# Patient Record
Sex: FEMALE | Race: WHITE | NOT HISPANIC OR LATINO | Employment: OTHER | URBAN - METROPOLITAN AREA
[De-identification: names, ages, dates, MRNs, and addresses within clinical notes are randomized per-mention and may not be internally consistent; named-entity substitution may affect disease eponyms.]

---

## 2017-09-06 ENCOUNTER — HISTORICAL (OUTPATIENT)
Dept: ADMINISTRATIVE | Facility: HOSPITAL | Age: 72
End: 2017-09-06

## 2017-10-18 LAB — BMD RECOMMENDATION EXT: NORMAL

## 2018-02-27 ENCOUNTER — HISTORICAL (OUTPATIENT)
Dept: ADMINISTRATIVE | Facility: HOSPITAL | Age: 73
End: 2018-02-27

## 2021-07-21 ENCOUNTER — HISTORICAL (OUTPATIENT)
Dept: ADMINISTRATIVE | Facility: HOSPITAL | Age: 76
End: 2021-07-21

## 2021-08-04 ENCOUNTER — HISTORICAL (OUTPATIENT)
Dept: ADMINISTRATIVE | Facility: HOSPITAL | Age: 76
End: 2021-08-04

## 2022-04-10 ENCOUNTER — HISTORICAL (OUTPATIENT)
Dept: ADMINISTRATIVE | Facility: HOSPITAL | Age: 77
End: 2022-04-10
Payer: MEDICARE

## 2022-04-29 VITALS
WEIGHT: 158.75 LBS | HEIGHT: 71 IN | OXYGEN SATURATION: 97 % | DIASTOLIC BLOOD PRESSURE: 74 MMHG | WEIGHT: 163.13 LBS | BODY MASS INDEX: 31.05 KG/M2 | HEIGHT: 61 IN | BODY MASS INDEX: 22.84 KG/M2 | SYSTOLIC BLOOD PRESSURE: 131 MMHG | OXYGEN SATURATION: 98 % | DIASTOLIC BLOOD PRESSURE: 77 MMHG | SYSTOLIC BLOOD PRESSURE: 148 MMHG | WEIGHT: 164.44 LBS | HEIGHT: 61 IN | BODY MASS INDEX: 29.97 KG/M2

## 2022-05-03 NOTE — HISTORICAL [facility]
This is a historical note converted from [employer]. Formatting and pictures may have been removed.  Please reference [employer] for original formatting and attached multimedia. Chief Complaint  right foot pain  History of Present Illness  75-year-old female with a right foot injury sustained 2 weeks ago. ?She was in her home when she?stepped on something I was on the floor and the foot got bent underneath her and she fell forward.? She had good bit of pain and difficulty bearing weight so she presents the emergency department was diagnosed with metatarsal fractures. ?She was given a boot.? She struggled for a couple of weeks with pain but?now recently the last few days she is starting to feel a good bit better.? She is still getting around on a crutch.? Some swelling, no open wounds.? Non-smoker, nondiabetic.  Review of Systems  Constitutional:?no fever, fatigue, weakness  Eye:?no vision loss, eye redness, drainage, or pain  ENMT:?no sore throat, ear pain, sinus pain/congestion, nasal congestion/drainage  Respiratory:?no cough, no wheezing, no shortness of breath  Cardiovascular:?no chest pain, no palpitations, no edema  Gastrointestinal:?no nausea, vomiting, or diarrhea. No abdominal pain  Physical Exam  Vitals & Measurements  HT:?156.00?cm? WT:?74.600?kg? BMI:?30.65?  No acute distress, alert and oriented  Regular rate on peripheral exam  No increased work of breathing  ?  Right lower extremity  The midfoot is?moderately swollen and tender to palpation  She is nontender palpation of the base of the fifth, the toes,?the calcaneus of the medial and lateral malleoli.? EHL/FHL intact and sensation is intact to light touch to all nerve distributions  DP 2+  ?  X-ray imaging of the right foot reviewed significant for?minimally displaced base of the second and third metatarsal fractures  Assessment/Plan  Pain in right foot?M79.671  ?Base of the second and?third metatarsal?fractures, nondisplaced  These fractures will do well  nonoperatively and she is clinically improving.? She will wear a boot for comfort for the next few weeks as she is unable to wean out of this as her symptoms resolved.? She will take over-the-counter medications for pain. ?She will follow-up with us on an as-needed basis. ?I attempted to answer all of her questions and?discussed with her diagnosis and expected course  ?  ATTENDING ADDENDUM  ?  Lynette Hernandez?was evaluated at the time of the encounter with?Dr Whiting.? HPI, PE and treatment plan was reviewed.? Treatment plan was reasonable and necessary.??Imaging was reviewed at the time of visit.??  ?  metatarsal fx  ?   Medications  DIGOXIN 125 MCG TABLET, 125 mcg= 1 tab(s), Oral, Daily  METOPROLOL SUCC ER 50 MG TAB, 50 mg= 1 tab(s), Oral, Daily  XARELTO 20 MG TABLET, 20 mg= 1 tab(s), Oral, qPM

## 2022-05-03 NOTE — HISTORICAL [facility]
This is a historical note converted from [employer]. Formatting and pictures may have been removed.  Please reference [place] for original formatting and attached multimedia. Chief Complaint  Right foot pain, swelling. Non traumatic  History of Present Illness  as stated in [facility]CCLURE.  Review of Systems  rt foot pain with redness/edema, bruising x today,? no injury noted. no footboard in the bed. feels like a bad sprain. has had a non traumatic wrist fracture 3 years ago. denies hx of spinal/hip/femur fractures. takes xarelto and dig for a fib, did not take them today.  Physical Exam  Vitals & Measurements  T:?37.2? ?C (Oral)? HR:?87(Peripheral)? RR:?18? BP:?131/77? SpO2:?98%?  HT:?156.00?cm? WT:?72.000?kg? BMI:?29.59?  General:?well-developed well-nourished in no acute distress  Eye: PERRLA, EOMI, clear conjunctiva, eyelids normal  Neck: full range of motion, no thyromegaly or lymphadenopathy  Cardiovascular:? right DP 2+, irregular, right PT 2+, irregular.  Gastrointestinal:?soft, non-tender, non-distended with normal bowel sounds  Musculoskeletal:?PANDEY, ambulatory  Integumentary: erythematous over dorsal midfoot. purple ecchymosis in the arch of the foot and medial plantar surface. no evidence of insect bites. the bony prominences of the 3rd, 4th, 5th toes and the great toe are equally erythematous and calloused.  Neurologic: cranial nerves?grossly?intact, no signs of peripheral neurological deficit  Assessment/Plan  1.?Metatarsal stress fracture of right foot?M84.374A,?  ?Ortho referral entered.  Placed in walking boot during visit.  Pt has crutches fitted to her at home.  Declines Rx pain med, states tylenol is adequate for her.  Light activity as tolerated, elevate often, ice foot up to tid up to 10m/time.  Please notify PCP of your fractures as this is the 2nd occurrence of non traumatic bone fractures in 3 years.  Metatarsal stress fracture of right foot?M84.374A  Ordered:  MD to Nurse, 07/21/21 18:50:00 CDT, Please  apply walking boot to R foot/LE., 07/21/21 18:50:00 CDT  Office/Outpatient Visit Level 4 New [id number] PC, Metatarsal stress fracture of right foot  Swelling of right foot, 07/21/21 18:51:00 CDT  ?  2.?Swelling of right foot?M79.89  ?as above.  Ordered:  Office/Outpatient Visit Level 4 New [id number] PC, Metatarsal stress fracture of right foot  Swelling of right foot, 07/21/21 18:51:00 CDT  ?  Referrals  [facility] Internal Referral to Orthopedics Clinic, Specialty: Orthopedic Surgery, Reason: Right foot non traumatic 2nd, 3rd metatarsal fractures. Pt has remote hx of non traumatic wrist fracture as well., Type: xray, walking boot, light activity, elevate often, ice up to tid., Start: 07/21/21 18:51:00...   Problem List/Past Medical History  Ongoing  Atrial fibrillation  Historical  No qualifying data  Procedure/Surgical History  Partial hysterectomy  Tonsil   Medications  DIGOXIN 125 MCG TABLET, 125 mcg= 1 tab(s), Oral, Daily  METOPROLOL SUCC ER 50 MG TAB, 50 mg= 1 tab(s), Oral, Daily  XARELTO 20 MG TABLET, 20 mg= 1 tab(s), Oral, qPM  Allergies  No Known Allergies  Social History  Abuse/Neglect  No, No, Yes, 07/21/2021  Alcohol  Current, 09/06/2017  Employment/School  Retired, 09/06/2017  Exercise  Exercise duration: 60. Exercise type: Walking, Weight lifting., 09/06/2017  Home/Environment  Lives with Alone., 09/06/2017  Nutrition/Health  Type of diet: Regular Diet. Regular, 09/06/2017  Sexual  Sexually active: No., 09/06/2017  Substance Use  Never, 09/06/2017  Tobacco  Never (less than 100 in lifetime), N/A, 07/21/2021  Family History  Atrial fibrillation: Brother.  Congestive heart disease.: Father.  Primary malignant neoplasm of lung: Mother.  Primary malignant neoplasm of prostate: Brother.  Health Maintenance  Health Maintenance  ???Pending?(in the next year)  ??? ??OverDue  ??? ? ? ?Bone Density Screening due??10/18/19??and every 2??year(s)  ??? ? ? ?Influenza Vaccine due??10/01/20??and every 1??day(s)  ??? ? ? ?Advance  Directive due??01/02/21??and every 1??year(s)  ??? ? ? ?Cognitive Screening due??01/02/21??and every 1??year(s)  ??? ? ? ?Functional Assessment due??01/02/21??and every 1??year(s)  ??? ??Due?  ??? ? ? ?Aspirin Therapy for CVD Prevention due??07/21/21??and every 1??year(s)  ??? ? ? ?Colorectal Screening due??07/21/21??Unknown Frequency  ??? ? ? ?Diabetes Screening due??07/21/21??Unknown Frequency  ??? ? ? ?Hypertension Management-BMP due??07/21/21??Unknown Frequency  ??? ? ? ?Lipid Screening due??07/21/21??Unknown Frequency  ??? ? ? ?Medicare Annual Wellness Exam due??07/21/21??and every 1??year(s)  ??? ? ? ?Pneumococcal Vaccine due??07/21/21??Unknown Frequency  ??? ? ? ?Tetanus Vaccine due??07/21/21??and every 10??year(s)  ??? ? ? ?Zoster Vaccine due??07/21/21??Unknown Frequency  ??? ??Due In Future?  ??? ? ? ?Obesity Screening not due until??01/01/22??and every 1??year(s)  ??? ? ? ?Alcohol Misuse Screening not due until??01/02/22??and every 1??year(s)  ??? ? ? ?Fall Risk Assessment not due until??01/02/22??and every 1??year(s)  ???Satisfied?(in the past 1 year)  ??? ??Satisfied?  ??? ? ? ?ADL Screening on??07/21/21.??Satisfied by Roselyn Lantigua LPN.  ??? ? ? ?Alcohol Misuse Screening on??07/21/21.??Satisfied by Roselyn Lantigua LPN.  ??? ? ? ?Blood Pressure Screening on??07/21/21.??Satisfied by Roselyn Lantigua LPN.  ??? ? ? ?Body Mass Index Check on??07/21/21.??Satisfied by Roselyn Lantigua LPN P.  ??? ? ? ?Depression Screening on??07/21/21.??Satisfied by Roselyn Lantigua LPN P.  ??? ? ? ?Fall Risk Assessment on??07/21/21.??Satisfied by Roselyn Lantigua LPN P.  ??? ? ? ?Hypertension Management-Blood Pressure on??07/21/21.??Satisfied by Roselyn Lantigua LPN P.  ??? ? ? ?Obesity Screening on??07/21/21.??Satisfied by Roselyn Lantigua LPN P.  ?  Diagnostic Results  (07/21/2021 18:31 CDT XR Foot Right Minimum 3 Views)  Radiology Report  XR Foot Right Minimum 3 Views  ?  HISTORY: Pitting edema, bruising R foot.  ?  COMPARISON:  None.  ?  FINDINGS:  There are nondisplaced fractures at the bases of the second and third  metatarsals. The Lisfranc interval remains well aligned. There are  scattered degenerative changes of the MTP and interphalangeal joints  with osteophyte formation. There is a mild hallux valgus deformity.  The soft tissues are unremarkable.  ?  ?  IMPRESSION:  Nondisplaced fractures at the bases of the second and third  metatarsals.  ?  ?  Signature Line  Electronically Signed By: Luli Ryan MD  Date/Time Signed: 07/21/2021 18:36 [1]     [1]?XR Foot Right Minimum 3 Views; Luli Ryan MD 07/21/2021 18:31 CDT

## 2022-07-14 ENCOUNTER — DOCUMENTATION ONLY (OUTPATIENT)
Dept: ADMINISTRATIVE | Facility: HOSPITAL | Age: 77
End: 2022-07-14
Payer: MEDICARE

## 2023-01-12 ENCOUNTER — OFFICE VISIT (OUTPATIENT)
Dept: INTERNAL MEDICINE | Facility: CLINIC | Age: 78
End: 2023-01-12
Payer: MEDICARE

## 2023-01-12 VITALS
SYSTOLIC BLOOD PRESSURE: 125 MMHG | DIASTOLIC BLOOD PRESSURE: 82 MMHG | HEIGHT: 61 IN | BODY MASS INDEX: 30.24 KG/M2 | TEMPERATURE: 98 F | WEIGHT: 160.19 LBS | HEART RATE: 103 BPM | OXYGEN SATURATION: 96 % | RESPIRATION RATE: 20 BRPM

## 2023-01-12 DIAGNOSIS — I48.91 ATRIAL FIBRILLATION, UNSPECIFIED TYPE: ICD-10-CM

## 2023-01-12 DIAGNOSIS — E55.9 VITAMIN D DEFICIENCY: ICD-10-CM

## 2023-01-12 DIAGNOSIS — Z11.59 NEED FOR HEPATITIS C SCREENING TEST: Primary | ICD-10-CM

## 2023-01-12 DIAGNOSIS — E78.5 HYPERLIPIDEMIA, UNSPECIFIED HYPERLIPIDEMIA TYPE: ICD-10-CM

## 2023-01-12 DIAGNOSIS — Z00.00 WELLNESS EXAMINATION: ICD-10-CM

## 2023-01-12 DIAGNOSIS — M81.0 OSTEOPOROSIS, UNSPECIFIED OSTEOPOROSIS TYPE, UNSPECIFIED PATHOLOGICAL FRACTURE PRESENCE: ICD-10-CM

## 2023-01-12 PROCEDURE — 99214 OFFICE O/P EST MOD 30 MIN: CPT | Mod: PBBFAC

## 2023-01-12 RX ORDER — RIVAROXABAN 20 MG/1
1 TABLET, FILM COATED ORAL DAILY
COMMUNITY
Start: 2022-09-08 | End: 2023-05-22 | Stop reason: SDUPTHER

## 2023-01-12 RX ORDER — METOPROLOL SUCCINATE 25 MG/1
1 TABLET, EXTENDED RELEASE ORAL DAILY
Status: ON HOLD | COMMUNITY
Start: 2022-08-29 | End: 2023-03-16 | Stop reason: SDUPTHER

## 2023-01-12 RX ORDER — DIGOXIN 125 MCG
1 TABLET ORAL DAILY
Status: ON HOLD | COMMUNITY
Start: 2022-08-29 | End: 2023-03-16 | Stop reason: HOSPADM

## 2023-01-18 ENCOUNTER — HOSPITAL ENCOUNTER (OUTPATIENT)
Dept: RADIOLOGY | Facility: HOSPITAL | Age: 78
Discharge: HOME | End: 2023-01-18
Attending: INTERNAL MEDICINE
Payer: MEDICARE

## 2023-01-18 DIAGNOSIS — M81.0 OSTEOPOROSIS, UNSPECIFIED OSTEOPOROSIS TYPE, UNSPECIFIED PATHOLOGICAL FRACTURE PRESENCE: ICD-10-CM

## 2023-01-18 PROCEDURE — 77080 DXA BONE DENSITY AXIAL: CPT | Mod: TC

## 2023-03-14 ENCOUNTER — HOSPITAL ENCOUNTER (INPATIENT)
Facility: HOSPITAL | Age: 78
LOS: 1 days | Discharge: HOME | DRG: 308 | End: 2023-03-16
Attending: EMERGENCY MEDICINE | Admitting: INTERNAL MEDICINE
Payer: MEDICARE

## 2023-03-14 ENCOUNTER — OFFICE VISIT (OUTPATIENT)
Dept: URGENT CARE | Facility: CLINIC | Age: 78
DRG: 308 | End: 2023-03-14
Payer: MEDICARE

## 2023-03-14 VITALS
DIASTOLIC BLOOD PRESSURE: 86 MMHG | RESPIRATION RATE: 20 BRPM | SYSTOLIC BLOOD PRESSURE: 132 MMHG | OXYGEN SATURATION: 96 % | WEIGHT: 160 LBS | HEIGHT: 61 IN | HEART RATE: 98 BPM | TEMPERATURE: 98 F | BODY MASS INDEX: 30.21 KG/M2

## 2023-03-14 DIAGNOSIS — R06.02 SOB (SHORTNESS OF BREATH) ON EXERTION: ICD-10-CM

## 2023-03-14 DIAGNOSIS — I50.20 HFREF (HEART FAILURE WITH REDUCED EJECTION FRACTION): ICD-10-CM

## 2023-03-14 DIAGNOSIS — I48.91 ATRIAL FIBRILLATION WITH RAPID VENTRICULAR RESPONSE: Primary | ICD-10-CM

## 2023-03-14 DIAGNOSIS — J81.0 ACUTE PULMONARY EDEMA: ICD-10-CM

## 2023-03-14 DIAGNOSIS — R68.89 FLU-LIKE SYMPTOMS: ICD-10-CM

## 2023-03-14 DIAGNOSIS — R09.89 SYMPTOMS OF UPPER RESPIRATORY INFECTION (URI): ICD-10-CM

## 2023-03-14 DIAGNOSIS — J30.9 ALLERGIC RHINITIS, UNSPECIFIED SEASONALITY, UNSPECIFIED TRIGGER: ICD-10-CM

## 2023-03-14 DIAGNOSIS — R07.9 CHEST PAIN: ICD-10-CM

## 2023-03-14 DIAGNOSIS — R07.9 CHEST PAIN, UNSPECIFIED TYPE: ICD-10-CM

## 2023-03-14 PROBLEM — R05.9 COUGH: Status: ACTIVE | Noted: 2023-03-14

## 2023-03-14 PROBLEM — R06.09 DOE (DYSPNEA ON EXERTION): Status: ACTIVE | Noted: 2023-03-14

## 2023-03-14 LAB
ALBUMIN SERPL-MCNC: 4.3 G/DL (ref 3.4–4.8)
ALBUMIN/GLOB SERPL: 1.3 RATIO (ref 1.1–2)
ALP SERPL-CCNC: 96 UNIT/L (ref 40–150)
ALT SERPL-CCNC: 29 UNIT/L (ref 0–55)
AST SERPL-CCNC: 29 UNIT/L (ref 5–34)
BASOPHILS # BLD AUTO: 0.04 X10(3)/MCL (ref 0–0.2)
BASOPHILS NFR BLD AUTO: 0.5 %
BILIRUBIN DIRECT+TOT PNL SERPL-MCNC: 1.6 MG/DL
BNP BLD-MCNC: 1283.1 PG/ML
BUN SERPL-MCNC: 11 MG/DL (ref 9.8–20.1)
CALCIUM SERPL-MCNC: 10.8 MG/DL (ref 8.4–10.2)
CHLORIDE SERPL-SCNC: 105 MMOL/L (ref 98–107)
CK MB SERPL-MCNC: 1.9 NG/ML
CK SERPL-CCNC: 109 U/L (ref 29–168)
CO2 SERPL-SCNC: 26 MMOL/L (ref 23–31)
CREAT SERPL-MCNC: 0.8 MG/DL (ref 0.55–1.02)
CTP QC/QA: YES
D DIMER PPP IA.FEU-MCNC: 0.84 UG/ML FEU (ref 0–0.5)
DIGOXIN SERPL-MCNC: 0.4 NG/ML (ref 0.8–2)
EOSINOPHIL # BLD AUTO: 0.03 X10(3)/MCL (ref 0–0.9)
EOSINOPHIL NFR BLD AUTO: 0.4 %
ERYTHROCYTE [DISTWIDTH] IN BLOOD BY AUTOMATED COUNT: 13.6 % (ref 11.5–17)
FLUAV AG UPPER RESP QL IA.RAPID: NOT DETECTED
FLUAV AG UPPER RESP QL IA.RAPID: NOT DETECTED
FLUBV AG UPPER RESP QL IA.RAPID: NOT DETECTED
FLUBV AG UPPER RESP QL IA.RAPID: NOT DETECTED
GFR SERPLBLD CREATININE-BSD FMLA CKD-EPI: >60 MLS/MIN/1.73/M2
GLOBULIN SER-MCNC: 3.4 GM/DL (ref 2.4–3.5)
GLUCOSE SERPL-MCNC: 98 MG/DL (ref 82–115)
HCT VFR BLD AUTO: 48 % (ref 37–47)
HGB BLD-MCNC: 15.8 G/DL (ref 12–16)
IMM GRANULOCYTES # BLD AUTO: 0.01 X10(3)/MCL (ref 0–0.04)
IMM GRANULOCYTES NFR BLD AUTO: 0.1 %
LYMPHOCYTES # BLD AUTO: 2.24 X10(3)/MCL (ref 0.6–4.6)
LYMPHOCYTES NFR BLD AUTO: 30.1 %
MAGNESIUM SERPL-MCNC: 2.3 MG/DL (ref 1.6–2.6)
MCH RBC QN AUTO: 34.2 PG
MCHC RBC AUTO-ENTMCNC: 32.9 G/DL (ref 33–36)
MCV RBC AUTO: 103.9 FL (ref 80–94)
MONOCYTES # BLD AUTO: 0.53 X10(3)/MCL (ref 0.1–1.3)
MONOCYTES NFR BLD AUTO: 7.1 %
NEUTROPHILS # BLD AUTO: 4.59 X10(3)/MCL (ref 2.1–9.2)
NEUTROPHILS NFR BLD AUTO: 61.8 %
NRBC BLD AUTO-RTO: 0 %
PHOSPHATE SERPL-MCNC: 2.9 MG/DL (ref 2.3–4.7)
PLATELET # BLD AUTO: 237 X10(3)/MCL (ref 130–400)
PMV BLD AUTO: 11.3 FL (ref 7.4–10.4)
POTASSIUM SERPL-SCNC: 3.8 MMOL/L (ref 3.5–5.1)
PROT SERPL-MCNC: 7.7 GM/DL (ref 5.8–7.6)
RBC # BLD AUTO: 4.62 X10(6)/MCL (ref 4.2–5.4)
SARS-COV-2 RDRP RESP QL NAA+PROBE: NEGATIVE
SARS-COV-2 RNA RESP QL NAA+PROBE: NOT DETECTED
SODIUM SERPL-SCNC: 141 MMOL/L (ref 136–145)
T4 FREE SERPL-MCNC: 1.07 NG/DL (ref 0.7–1.48)
TROPONIN I SERPL-MCNC: 0.02 NG/ML (ref 0–0.04)
TSH SERPL-ACNC: 1.03 UIU/ML (ref 0.35–4.94)
WBC # SPEC AUTO: 7.4 X10(3)/MCL (ref 4.5–11.5)

## 2023-03-14 PROCEDURE — 84443 ASSAY THYROID STIM HORMONE: CPT | Performed by: STUDENT IN AN ORGANIZED HEALTH CARE EDUCATION/TRAINING PROGRAM

## 2023-03-14 PROCEDURE — 80162 ASSAY OF DIGOXIN TOTAL: CPT | Performed by: EMERGENCY MEDICINE

## 2023-03-14 PROCEDURE — 25000003 PHARM REV CODE 250: Performed by: EMERGENCY MEDICINE

## 2023-03-14 PROCEDURE — 0240U COVID/FLU A&B PCR: CPT | Performed by: EMERGENCY MEDICINE

## 2023-03-14 PROCEDURE — 99214 OFFICE O/P EST MOD 30 MIN: CPT | Mod: PBBFAC,25

## 2023-03-14 PROCEDURE — 25000003 PHARM REV CODE 250: Performed by: STUDENT IN AN ORGANIZED HEALTH CARE EDUCATION/TRAINING PROGRAM

## 2023-03-14 PROCEDURE — 87502 INFLUENZA DNA AMP PROBE: CPT

## 2023-03-14 PROCEDURE — 96376 TX/PRO/DX INJ SAME DRUG ADON: CPT

## 2023-03-14 PROCEDURE — 84439 ASSAY OF FREE THYROXINE: CPT | Performed by: STUDENT IN AN ORGANIZED HEALTH CARE EDUCATION/TRAINING PROGRAM

## 2023-03-14 PROCEDURE — 84484 ASSAY OF TROPONIN QUANT: CPT | Performed by: EMERGENCY MEDICINE

## 2023-03-14 PROCEDURE — 83735 ASSAY OF MAGNESIUM: CPT | Performed by: STUDENT IN AN ORGANIZED HEALTH CARE EDUCATION/TRAINING PROGRAM

## 2023-03-14 PROCEDURE — 99204 PR OFFICE/OUTPT VISIT, NEW, LEVL IV, 45-59 MIN: ICD-10-PCS | Mod: S$PBB,,,

## 2023-03-14 PROCEDURE — 85025 COMPLETE CBC W/AUTO DIFF WBC: CPT | Performed by: EMERGENCY MEDICINE

## 2023-03-14 PROCEDURE — 99204 OFFICE O/P NEW MOD 45 MIN: CPT | Mod: S$PBB,,,

## 2023-03-14 PROCEDURE — 93005 ELECTROCARDIOGRAM TRACING: CPT

## 2023-03-14 PROCEDURE — G0378 HOSPITAL OBSERVATION PER HR: HCPCS

## 2023-03-14 PROCEDURE — 84100 ASSAY OF PHOSPHORUS: CPT | Performed by: STUDENT IN AN ORGANIZED HEALTH CARE EDUCATION/TRAINING PROGRAM

## 2023-03-14 PROCEDURE — 83880 ASSAY OF NATRIURETIC PEPTIDE: CPT | Performed by: EMERGENCY MEDICINE

## 2023-03-14 PROCEDURE — 82553 CREATINE MB FRACTION: CPT | Performed by: EMERGENCY MEDICINE

## 2023-03-14 PROCEDURE — 63600175 PHARM REV CODE 636 W HCPCS: Performed by: EMERGENCY MEDICINE

## 2023-03-14 PROCEDURE — 82550 ASSAY OF CK (CPK): CPT | Performed by: EMERGENCY MEDICINE

## 2023-03-14 PROCEDURE — 80053 COMPREHEN METABOLIC PANEL: CPT | Performed by: EMERGENCY MEDICINE

## 2023-03-14 PROCEDURE — 87635 SARS-COV-2 COVID-19 AMP PRB: CPT | Mod: PBBFAC

## 2023-03-14 PROCEDURE — 99285 EMERGENCY DEPT VISIT HI MDM: CPT | Mod: 25,27

## 2023-03-14 PROCEDURE — 85379 FIBRIN DEGRADATION QUANT: CPT | Performed by: EMERGENCY MEDICINE

## 2023-03-14 PROCEDURE — 96375 TX/PRO/DX INJ NEW DRUG ADDON: CPT

## 2023-03-14 RX ORDER — ENALAPRILAT 1.25 MG/ML
1.25 INJECTION INTRAVENOUS EVERY 6 HOURS PRN
Status: DISCONTINUED | OUTPATIENT
Start: 2023-03-14 | End: 2023-03-15

## 2023-03-14 RX ORDER — METOPROLOL SUCCINATE 25 MG/1
25 TABLET, EXTENDED RELEASE ORAL 2 TIMES DAILY
Status: DISCONTINUED | OUTPATIENT
Start: 2023-03-15 | End: 2023-03-14

## 2023-03-14 RX ORDER — FUROSEMIDE 10 MG/ML
20 INJECTION INTRAMUSCULAR; INTRAVENOUS
Status: COMPLETED | OUTPATIENT
Start: 2023-03-14 | End: 2023-03-14

## 2023-03-14 RX ORDER — METOPROLOL SUCCINATE 25 MG/1
25 TABLET, EXTENDED RELEASE ORAL 2 TIMES DAILY
Status: DISCONTINUED | OUTPATIENT
Start: 2023-03-14 | End: 2023-03-15

## 2023-03-14 RX ORDER — SODIUM CHLORIDE 0.9 % (FLUSH) 0.9 %
10 SYRINGE (ML) INJECTION
Status: DISCONTINUED | OUTPATIENT
Start: 2023-03-14 | End: 2023-03-16 | Stop reason: HOSPADM

## 2023-03-14 RX ORDER — TALC
6 POWDER (GRAM) TOPICAL NIGHTLY PRN
Status: DISCONTINUED | OUTPATIENT
Start: 2023-03-14 | End: 2023-03-16 | Stop reason: HOSPADM

## 2023-03-14 RX ORDER — METOPROLOL TARTRATE 1 MG/ML
10 INJECTION, SOLUTION INTRAVENOUS
Status: COMPLETED | OUTPATIENT
Start: 2023-03-14 | End: 2023-03-14

## 2023-03-14 RX ORDER — LABETALOL HCL 20 MG/4 ML
10 SYRINGE (ML) INTRAVENOUS EVERY 4 HOURS PRN
Status: DISCONTINUED | OUTPATIENT
Start: 2023-03-14 | End: 2023-03-14

## 2023-03-14 RX ORDER — DIGOXIN 125 MCG
0.12 TABLET ORAL DAILY
Status: DISCONTINUED | OUTPATIENT
Start: 2023-03-15 | End: 2023-03-15

## 2023-03-14 RX ORDER — METOPROLOL TARTRATE 1 MG/ML
5 INJECTION, SOLUTION INTRAVENOUS EVERY 5 MIN PRN
Status: DISCONTINUED | OUTPATIENT
Start: 2023-03-14 | End: 2023-03-15

## 2023-03-14 RX ADMIN — METOPROLOL TARTRATE 10 MG: 1 INJECTION, SOLUTION INTRAVENOUS at 02:03

## 2023-03-14 RX ADMIN — METOPROLOL SUCCINATE 25 MG: 25 TABLET, EXTENDED RELEASE ORAL at 08:03

## 2023-03-14 RX ADMIN — METOPROLOL TARTRATE 10 MG: 1 INJECTION, SOLUTION INTRAVENOUS at 04:03

## 2023-03-14 RX ADMIN — FUROSEMIDE 20 MG: 10 INJECTION, SOLUTION INTRAVENOUS at 04:03

## 2023-03-14 NOTE — PROGRESS NOTES
"Subjective:       Patient ID: Lynette Hernandez is a 77 y.o. female.    Vitals:  height is 5' 1" (1.549 m) and weight is 72.6 kg (160 lb). Her oral temperature is 98.4 °F (36.9 °C). Her blood pressure is 132/86 and her pulse is 98. Her respiration is 20 and oxygen saturation is 96%.     Chief Complaint: Cough, Shortness of Breath, Chest Pain (tightness), and Nasal Congestion (Patient states she was feeling bad x 1 week, got better, then started feeling bad again yesterday)    Cc as above. States symptoms started a week ago. It comes and goes. Last episode of chest discomfort and SOB was on Sunday.     Cough  This is a new problem. The current episode started yesterday. The problem has been unchanged. Episode frequency: intermittent. The cough is Non-productive. Associated symptoms include chest pain and shortness of breath. The symptoms are aggravated by lying down. atrial fibrillation   Shortness of Breath  This is a new problem. The current episode started in the past 7 days. The problem occurs intermittently. Associated symptoms include chest pain. Exacerbated by: exertion.   Chest Pain   This is a new problem. The current episode started in the past 7 days. The problem occurs intermittently. Associated symptoms include a cough and shortness of breath. Past medical history comments: atrial fibrillation     Cardiovascular:  Positive for chest pain.   Respiratory:  Positive for cough and shortness of breath.      Objective:      Physical Exam   Constitutional: She is oriented to person, place, and time. She appears well-developed. She is cooperative.  Non-toxic appearance. She does not appear ill. No distress.   HENT:   Head: Normocephalic and atraumatic.   Ears:   Right Ear: Hearing, external ear and ear canal normal. A middle ear effusion is present.   Left Ear: Hearing, external ear and ear canal normal. A middle ear effusion is present.   Nose: Congestion present. No mucosal edema, rhinorrhea or nasal deformity. " No epistaxis. Right sinus exhibits no maxillary sinus tenderness and no frontal sinus tenderness. Left sinus exhibits no maxillary sinus tenderness and no frontal sinus tenderness.   Mouth/Throat: Uvula is midline, oropharynx is clear and moist and mucous membranes are normal. Mucous membranes are moist. No trismus in the jaw. Normal dentition. No uvula swelling. No posterior oropharyngeal erythema. Oropharynx is clear.   Mild nasal turbates      Comments: Mild nasal turbates  Eyes: Conjunctivae and lids are normal. Right eye exhibits no discharge. Left eye exhibits no discharge. No scleral icterus.   Neck: Trachea normal and phonation normal. Neck supple.   Cardiovascular: Normal rate, normal heart sounds and normal pulses. An irregular rhythm present.   Pulmonary/Chest: Effort normal and breath sounds normal. No respiratory distress.   Abdominal: Normal appearance.   Musculoskeletal: Normal range of motion.         General: Normal range of motion.   Neurological: She is alert and oriented to person, place, and time. She exhibits normal muscle tone.   Skin: Skin is warm, dry, intact and not diaphoretic.   Psychiatric: Her speech is normal and behavior is normal. Mood, judgment and thought content normal.   Nursing note and vitals reviewed.      Assessment:       1. Atrial fibrillation with rapid ventricular response    2. Chest pain, unspecified type    3. SOB (shortness of breath) on exertion    4. Symptoms of upper respiratory infection (URI)    5. Flu-like symptoms    6. Allergic rhinitis, unspecified seasonality, unspecified trigger             Results for orders placed or performed in visit on 03/14/23   POCT COVID-19 Rapid Screening   Result Value Ref Range    POC Rapid COVID Negative Negative     Acceptable Yes       Plan:         Atrial fibrillation with rapid ventricular response  -     Refer to Emergency Dept.    Chest pain, unspecified type    SOB (shortness of breath) on  exertion    Symptoms of upper respiratory infection (URI)  -     POCT COVID-19 Rapid Screening    Flu-like symptoms  -     FLU A & B PCR; Future; Expected date: 03/14/2023    Allergic rhinitis, unspecified seasonality, unspecified trigger    EKG- Atrial fibrillation w/RVR. Report called to MICHAEL Arias in ER.  She needs higher level of care (cardiac workup). Sent to ER per wheelchair.

## 2023-03-14 NOTE — H&P
[facility] Internal Medicine History and Physical     Date of Admit: 3/14/2023    Chief Complaint     Palpitations and Shortness of Breath (PT BROUGHT FROM  FOR CARD. TRACY.  PT REPORTS SOB, CHEST TIGHTNESS W PAL. AND FATIGUE X 1 WK.  EKG OBTAINED AFIB W RVR NOTED. )     Subjective:      History of Present Illness:  Lynette Hernandez is a 77 y.o. female who has a PMH of hypertension, RBBB (present on EKG from 2004), and atrial fibrillation for 6 years (follows with Dr. Crystal). The patient presented to [facility] ED on 3/14/2023 with a primary complaint of SOB and chest heaviness for 1 week. Patient initially presented to Urgent Care but was sent to ED because of HR in 140s. Patient repots onset of chest heaviness and shortness of breath about 10 days ago on Saturday evening when she was awakened from sleep due to these symptoms. Symptoms did not resolve with deep breaths and took several minutes to resolve. Patient states she then noticed her breathing was better and would worsen during these episodes intermittently for the next week. She denies similar episodes in the past. States she has also noticed she becomes fatigued more easily with exertion and has a dry cough which all developed about 1 week ago. Denies fever, chills, n/v, dysuria, diarrhea, constipation. Denies any recent changes to medication. Upon arrival to ED, vitals were as follows: T 97.9F, /109, , RR 18, SpO2 96% on RA. ED workup significant for negative troponin 0.021, mildly elevated d dimer, non-ischemic EKG, elevated BNP 1283, subtherapeutic digoxin level 0.4, and XR Chest concerning for pulmonary vascular congestion. Admitted to Internal Medicine for management of AF RVR and further workup for possible new onset CHF.    Past Medical History:  Past Medical History:   Diagnosis Date    Anticoagulant long-term use     Atrial fibrillation     Hypertension        Past Surgical History:  History reviewed. No pertinent surgical  "history.    Allergies:  Review of patient's allergies indicates:  No Known Allergies    Home Medications:  Prior to Admission medications    Medication Sig Start Date End Date Taking? Authorizing Provider   digoxin (LANOXIN) 125 mcg tablet Take 1 tablet by mouth Daily. 8/29/22   Historical Provider   metoprolol succinate (TOPROL-XL) 25 MG 24 hr tablet Take 1 tablet by mouth once daily. 8/29/22   Historical Provider   XARELTO 20 mg Tab Take 1 tablet by mouth once daily. 9/8/22   Historical Provider       Family History:  Family History   Problem Relation Age of Onset    Lung cancer Mother     Heart disease Father     Heart disease Brother        Social History:  Social History     Tobacco Use    Smoking status: Never     Passive exposure: Past    Smokeless tobacco: Never   Substance Use Topics    Alcohol use: Yes     Alcohol/week: 1.0 standard drink     Types: 1 Glasses of wine per week    Drug use: Never       Review of Systems:  Review of Systems   Constitutional:  Positive for malaise/fatigue.   HENT: Negative.     Eyes: Negative.    Respiratory:  Positive for cough and shortness of breath. Negative for sputum production.    Cardiovascular:  Positive for orthopnea. Negative for leg swelling.   Gastrointestinal: Negative.    Genitourinary: Negative.    Musculoskeletal: Negative.    Skin: Negative.    Neurological:  Positive for headaches.        Objective:   Last 24 Hour Vital Signs:  Vitals  BP: (!) 149/107  Temp: 98.6 °F (37 °C)  Temp Source: Tympanic  Pulse: (!) 126  Resp: 18  SpO2: 95 %  Height: 5' 2" (157.5 cm)  Weight: 72 kg (158 lb 11.7 oz)    Physical Examination:  General: Patient resting comfortably, in no acute distress   HEENT: Head-normocephalic and atraumatic, EOMI, dry mucous membranes  Respiratory: clear to auscultation bilaterally and upper lung fields, decreased breath sounds with mild fine crackles at bilateral bases  Cardiovascular: irregular rate and rhythm without murmurs.  No gallops or rubs, " no JVD.    Gastrointestinal: soft, non-tender, non-distended with normal bowel sounds, without masses to palpation  Musculoskeletal: full range of motion of all extremities/spine without limitation or discomfort, mild left ankle edema, no LE edema  Integumentary: no rashes or skin lesions present  Neurologic: no signs of peripheral neurological deficit, motor/sensory function intact  Psychiatric:  alert and oriented, cognitive function intact, cooperative with exam        Laboratory:  Most Recent Data:  Most Recent Data:  CBC:   Lab Results   Component Value Date    WBC 7.4 03/14/2023    HGB 15.8 03/14/2023    HCT 48.0 (H) 03/14/2023     03/14/2023    .9 (H) 03/14/2023    RDW 13.6 03/14/2023     WBC Differential:   Recent Labs   Lab 03/14/23  1448   WBC 7.4   HGB 15.8   HCT 48.0*      .9*     BMP:   Lab Results   Component Value Date     03/14/2023    K 3.8 03/14/2023    CO2 26 03/14/2023    BUN 11.0 03/14/2023    CREATININE 0.80 03/14/2023    CALCIUM 10.8 (H) 03/14/2023     LFTs:   Lab Results   Component Value Date    ALBUMIN 4.3 03/14/2023    BILITOT 1.6 (H) 03/14/2023    AST 29 03/14/2023    ALKPHOS 96 03/14/2023    ALT 29 03/14/2023     Coags: No results found for: INR, PROTIME, PTT  FLP: No results found for: CHOL, HDL, LDLCALC, TRIG, CHOLHDL  DM:   Lab Results   Component Value Date    CREATININE 0.80 03/14/2023     Thyroid: No results found for: TSH, FREET4, Z0QCMLR, O9KEWBA, THYROIDAB  Anemia: No results found for: IRON, TIBC, FERRITIN, [id number], FOLATE  Cardiac:   Lab Results   Component Value Date    TROPONINI 0.021 03/14/2023     Urinalysis: No results found for: LABURIN, COLORU, PHUA, CLARITYU, SPECGRAV, LABSPEC, NITRITE, PROTEINUR, GLUCOSEU, KETONESU, UROBILINOGEN, BILIRUBINUR, BLOODU, RBCU, WBCUA    Radiology:  Imaging Results              X-Ray Chest AP Portable (Final result)  Result time 03/14/23 15:13:54      Final result by Jay Oneal MD (03/14/23  15:13:54)                   Impression:      Interstitial predominant opacities with small pleural effusions, suspect pulmonary vascular congestion.      Electronically signed by: Jay Oneal  Date:    03/14/2023  Time:    15:13               Narrative:    EXAMINATION:  XR CHEST AP PORTABLE    CLINICAL HISTORY:  Chest Pain;    COMPARISON:  27 February 2018    FINDINGS:  Portable frontal view of the chest was obtained. Cardiac silhouette is enlarged.  There are bilateral interstitial predominant opacities.  Suspect small pleural effusions.  No pneumothorax.                                           Assessment & Plan:     AF RVR  -Hx of A Fib x 6 years  -Home regimen: digoxin 0.125mg daily, Toprol 25mg daily, Xarelto 20mg daily  -HR up to 130s in ED, treated with IV metoprolol x 2  -Come home digoxin, xarelto  -Increase metoprolol to 25mg BID  -Tele monitoring    Possible new onset CHF  -Dry cough, SOB, CARUSO x 1 week  -XR Chest with b/l interstitial predominant opacities, small pleural effusions, suspected pulmonary vascular congestion  -COVID/FLU negative  -Given lasix 20mg IV x 1 in ED  -Trop negative at 0.021, BNP 1283.1  -ECHO tomorrow   -Strict I's and O's    Hypertension  -BP up to 149/107 in ED  -Toprol for AF increased to 35mg BID  -PRN antihypertensives    Hypercalcemia  -Initial Ca 10.8  -Diuresing at this time, encourage PO intake  -Follow up AM labs      CODE STATUS: Full Code  Access: Peripheral  Antibiotics: None  Diet: Cardiac  DVT Prophylaxis: Xarelto, SCDs  GI Prophylaxis: None  Fluids: None    Dispo: Day 1 admission for AF RVR and possible new onset CHF      Marielena Rose MD  [facility], -  3/14/2023

## 2023-03-14 NOTE — ED PROVIDER NOTES
Encounter Date: 3/14/2023       History     Chief Complaint   Patient presents with    Palpitations    Shortness of Breath     PT BROUGHT FROM  FOR CARD. TRACY.  PT REPORTS SOB, CHEST TIGHTNESS W PAL. AND FATIGUE X 1 WK.  EKG OBTAINED AFIB W RVR NOTED.      Symptoms for about a week, presented at urgent care and referred here for further evaluation.  She describes intermittent and somewhat irregular symptoms of dyspnea particularly on exertion, a sense of irregular breathing, and a sense of chest tightness at times.  No pain, fever, cough, or lower extremity swelling.  Her leg sometimes hurt with walking as well.  She is not aware for heart rate but is known to have had atrial fibrillation for about 6 years on chronic Xarelto, digoxin, and 25 mg once a day metoprolol.  She feels mildly generally fatigued.  No syncope or presyncope.  Noted earlier today to have heart rates ranging from .  No other complaints.    The history is provided by the patient. No  was used.   Review of patient's allergies indicates:  No Known Allergies  Past Medical History:   Diagnosis Date    Anticoagulant long-term use     Atrial fibrillation     Hypertension      History reviewed. No pertinent surgical history.  Family History   Problem Relation Age of Onset    Lung cancer Mother     Heart disease Father     Heart disease Brother      Social History     Tobacco Use    Smoking status: Never     Passive exposure: Past    Smokeless tobacco: Never   Substance Use Topics    Alcohol use: Yes     Alcohol/week: 1.0 standard drink     Types: 1 Glasses of wine per week    Drug use: Never     Review of Systems   Constitutional:  Negative for activity change, fatigue and fever.   HENT:  Negative for congestion, ear pain, facial swelling, nosebleeds, sinus pressure and sore throat.    Eyes:  Negative for pain, discharge, redness and visual disturbance.   Respiratory:  Positive for chest tightness and shortness of  breath. Negative for cough, choking and wheezing.    Cardiovascular:  Negative for chest pain, palpitations and leg swelling.   Gastrointestinal:  Negative for abdominal distention, abdominal pain, nausea and vomiting.   Endocrine: Negative for heat intolerance, polydipsia and polyuria.   Genitourinary:  Negative for difficulty urinating, dysuria, flank pain, hematuria and urgency.   Musculoskeletal:  Negative for back pain, gait problem, joint swelling and myalgias.   Skin:  Negative for color change and rash.   Allergic/Immunologic: Negative for environmental allergies and food allergies.   Neurological:  Negative for dizziness, weakness, numbness and headaches.   Hematological:  Negative for adenopathy. Does not bruise/bleed easily.   Psychiatric/Behavioral:  Negative for agitation and behavioral problems. The patient is not nervous/anxious.    All other systems reviewed and are negative.    Physical Exam     Initial Vitals [03/14/23 1348]   BP Pulse Resp Temp SpO2   (!) 145/109 (!) 135 18 97.9 °F (36.6 °C) 96 %      MAP       --         Physical Exam    Nursing note and vitals reviewed.  Constitutional: She appears well-developed and well-nourished. She is not diaphoretic. No distress.   HENT:   Head: Normocephalic and atraumatic.   Mouth/Throat: No oropharyngeal exudate.   Eyes: Conjunctivae and EOM are normal. Pupils are equal, round, and reactive to light. Right eye exhibits no discharge. Left eye exhibits no discharge. No scleral icterus.   Neck: Neck supple. No thyromegaly present. No tracheal deviation present. No JVD present.   Normal range of motion.  Cardiovascular:  Normal heart sounds and intact distal pulses.     Exam reveals no gallop and no friction rub.       No murmur heard.  Tachycardic irregular rate and rhythm, atrial fibrillation with ventricular response between 115 and 145 by monitor   Pulmonary/Chest: Breath sounds normal. No stridor. No respiratory distress. She has no wheezes. She has no  rhonchi. She has no rales. She exhibits no tenderness.   Abdominal: Abdomen is soft. Bowel sounds are normal. She exhibits no distension and no mass. There is no abdominal tenderness. There is no rebound and no guarding.   Musculoskeletal:         General: No tenderness or edema. Normal range of motion.      Cervical back: Normal range of motion and neck supple.     Neurological: She is alert and oriented to person, place, and time. She has normal strength.   Skin: Skin is warm and dry. No rash and no abscess noted. No erythema.   Psychiatric: She has a normal mood and affect. Her behavior is normal. Judgment and thought content normal.       ED Course   Critical Care    Date/Time: 3/14/2023 8:00 PM  Performed by: Jay Whittaker MD  Authorized by: Betty Reed MD   Direct patient critical care time: 10 minutes  Additional history critical care time: 10 minutes  Ordering / reviewing critical care time: 5 minutes  Documentation critical care time: 10 minutes  Consulting other physicians critical care time: 5 minutes  Total critical care time (exclusive of procedural time) : 40 minutes  Critical care time was exclusive of separately billable procedures and treating other patients and teaching time.  Critical care was necessary to treat or prevent imminent or life-threatening deterioration of the following conditions: cardiac failure and respiratory failure.  Critical care was time spent personally by me on the following activities: development of treatment plan with patient or surrogate, examination of patient, obtaining history from patient or surrogate, ordering and performing treatments and interventions, ordering and review of laboratory studies, ordering and review of radiographic studies and pulse oximetry.      Labs Reviewed   COMPREHENSIVE METABOLIC PANEL - Abnormal; Notable for the following components:       Result Value    Calcium Level Total 10.8 (*)     Protein Total 7.7 (*)     Bilirubin Total 1.6 (*)      All other components within normal limits   DIGOXIN LEVEL - Abnormal; Notable for the following components:    Digoxin Level 0.4 (*)     All other components within normal limits   D DIMER, QUANTITATIVE - Abnormal; Notable for the following components:    D-Dimer 0.84 (*)     All other components within normal limits   CBC WITH DIFFERENTIAL - Abnormal; Notable for the following components:    Hct 48.0 (*)     .9 (*)     MCHC 32.9 (*)     MPV 11.3 (*)     All other components within normal limits   TROPONIN I - Normal   CK - Normal   CK-MB - Normal   CBC W/ AUTO DIFFERENTIAL    Narrative:     The following orders were created for panel order CBC auto differential.  Procedure                               Abnormality         Status                     ---------                               -----------         ------                     CBC with Differential[[id number]]        Abnormal            Final result                 Please view results for these tests on the individual orders.   COVID/FLU A&B PCR   EXTRA TUBES    Narrative:     The following orders were created for panel order EXTRA TUBES.  Procedure                               Abnormality         Status                     ---------                               -----------         ------                     Pink Top Hold[[id number]]                                    In process                   Please view results for these tests on the individual orders.   PINK TOP HOLD   B-TYPE NATRIURETIC PEPTIDE     EKG Readings: (Independently Interpreted)   Initial Reading: No STEMI. Rhythm: Atrial Fibrillation. Heart Rate: 111. Conduction: RBBB.   Atrial fibrillation with rapid ventricular response, right bundle-branch block, no acute ischemic change.   ECG Results              EKG 12-lead (Final result)  Result time 03/14/23 15:19:06      Final result by Interface, Lab In [facility] (03/14/23 15:19:06)                   Narrative:    Test Reason :  R07.9,    Vent. Rate : 111 BPM     Atrial Rate : 104 BPM     P-R Int : 000 ms          QRS Dur : 132 ms      QT Int : 364 ms       P-R-T Axes : 000 079 057 degrees     QTc Int : 495 ms    Atrial fibrillation with rapid ventricular response  Right bundle branch block  Abnormal ECG  No previous ECGs available  Confirmed by Kuldip Paul MD ([id number]) on 3/14/2023 3:18:55 PM    Referred By: [email or web address]   SELF           Confirmed By:Kuldip Paul MD                                  Imaging Results              X-Ray Chest AP Portable (Final result)  Result time 03/14/23 15:13:54      Final result by Jay Oneal MD (03/14/23 15:13:54)                   Impression:      Interstitial predominant opacities with small pleural effusions, suspect pulmonary vascular congestion.      Electronically signed by: Jay Oneal  Date:    03/14/2023  Time:    15:13               Narrative:    EXAMINATION:  XR CHEST AP PORTABLE    CLINICAL HISTORY:  Chest Pain;    COMPARISON:  27 February 2018    FINDINGS:  Portable frontal view of the chest was obtained. Cardiac silhouette is enlarged.  There are bilateral interstitial predominant opacities.  Suspect small pleural effusions.  No pneumothorax.                                       Medications   metoprolol injection 10 mg (has no administration in time range)   furosemide injection 20 mg (has no administration in time range)   metoprolol injection 10 mg (10 mg Intravenous Given 3/14/23 1450)        Additional MDM:   Differential Diagnosis:   A fib RVR/ MI/ CHF/ Pleural effusions                   3:57 PM Heart rate now in the upper 90s to low 100s, feels better, otherwise no clinical change.  Reviewed findings in detail with patient and family, consulted Internal Medicine.  Additional beta blockade, gentle diuresis, admit for further adjustment and echocardiogram.  She has not had an echocardiogram in many years.    Medical Decision Making  Problems Addressed:  Acute pulmonary edema:  acute illness or injury  Atrial fibrillation with rapid ventricular response: chronic illness or injury with exacerbation, progression, or side effects of treatment    Amount and/or Complexity of Data Reviewed  Labs: ordered. Decision-making details documented in ED Course.  Radiology: ordered. Decision-making details documented in ED Course.  ECG/medicine tests: ordered and independent interpretation performed. Decision-making details documented in ED Course.    Risk  Prescription drug management.  Decision regarding hospitalization.               Clinical Impression:   Final diagnoses:  [R07.9] Chest pain  [I48.91] Atrial fibrillation with rapid ventricular response (Primary)  [J81.0] Acute pulmonary edema        ED Disposition Condition    Observation Stable                Jay Whittaker MD  03/14/23 1600       Jay Whittaker MD  04/02/23 1148

## 2023-03-15 LAB
ALBUMIN SERPL-MCNC: 3.5 G/DL (ref 3.4–4.8)
ALBUMIN/GLOB SERPL: 1.4 RATIO (ref 1.1–2)
ALP SERPL-CCNC: 78 UNIT/L (ref 40–150)
ALT SERPL-CCNC: 25 UNIT/L (ref 0–55)
AORTIC ROOT ANNULUS: 3 CM
AST SERPL-CCNC: 24 UNIT/L (ref 5–34)
AV INDEX (PROSTH): 0.45
AV MEAN GRADIENT: 7 MMHG
AV PEAK GRADIENT: 10 MMHG
AV REGURGITATION PRESSURE HALF TIME: 651.17 MS
AV VALVE AREA: 1.41 CM2
AV VELOCITY RATIO: 0.42
BASOPHILS # BLD AUTO: 0.04 X10(3)/MCL (ref 0–0.2)
BASOPHILS NFR BLD AUTO: 0.6 %
BILIRUBIN DIRECT+TOT PNL SERPL-MCNC: 1.4 MG/DL
BSA FOR ECHO PROCEDURE: 1.77 M2
BUN SERPL-MCNC: 13.6 MG/DL (ref 9.8–20.1)
CALCIUM SERPL-MCNC: 10 MG/DL (ref 8.4–10.2)
CHLORIDE SERPL-SCNC: 107 MMOL/L (ref 98–107)
CO2 SERPL-SCNC: 25 MMOL/L (ref 23–31)
CREAT SERPL-MCNC: 0.75 MG/DL (ref 0.55–1.02)
CV ECHO LV RWT: 0.5 CM
DOP CALC AO PEAK VEL: 1.58 M/S
DOP CALC AO VTI: 21.8 CM
DOP CALC LVOT AREA: 3.1 CM2
DOP CALC LVOT DIAMETER: 1.99 CM
DOP CALC LVOT PEAK VEL: 0.66 M/S
DOP CALC LVOT STROKE VOLUME: 30.78 CM3
DOP CALC MV VTI: 27.3 CM
DOP CALC RVOT AREA: 17.34 CM2
DOP CALC RVOT DIAMETER: 4.7 CM
DOP CALCLVOT PEAK VEL VTI: 9.9 CM
E WAVE DECELERATION TIME: 161.88 MSEC
E/A RATIO: 101
E/E' RATIO: 40.4 M/S
ECHO LV POSTERIOR WALL: 1.07 CM (ref 0.6–1.1)
EJECTION FRACTION: 20 %
EOSINOPHIL # BLD AUTO: 0.05 X10(3)/MCL (ref 0–0.9)
EOSINOPHIL NFR BLD AUTO: 0.8 %
ERYTHROCYTE [DISTWIDTH] IN BLOOD BY AUTOMATED COUNT: 13.5 % (ref 11.5–17)
FOLATE SERPL-MCNC: 12.5 NG/ML (ref 7–31.4)
FRACTIONAL SHORTENING: 15 % (ref 28–44)
GFR SERPLBLD CREATININE-BSD FMLA CKD-EPI: >60 MLS/MIN/1.73/M2
GLOBULIN SER-MCNC: 2.5 GM/DL (ref 2.4–3.5)
GLUCOSE SERPL-MCNC: 103 MG/DL (ref 82–115)
HCT VFR BLD AUTO: 40 % (ref 37–47)
HGB BLD-MCNC: 13.1 G/DL (ref 12–16)
HR MV ECHO: 125 BPM
IMM GRANULOCYTES # BLD AUTO: 0.02 X10(3)/MCL (ref 0–0.04)
IMM GRANULOCYTES NFR BLD AUTO: 0.3 %
INTERVENTRICULAR SEPTUM: 0.99 CM (ref 0.6–1.1)
LEFT ATRIUM SIZE: 4.38 CM
LEFT ATRIUM VOLUME INDEX MOD: 52 ML/M2
LEFT ATRIUM VOLUME MOD: 90 CM3
LEFT INTERNAL DIMENSION IN SYSTOLE: 3.62 CM (ref 2.1–4)
LEFT VENTRICLE DIASTOLIC VOLUME INDEX: 54.91 ML/M2
LEFT VENTRICLE DIASTOLIC VOLUME: 95 ML
LEFT VENTRICLE MASS INDEX: 85 G/M2
LEFT VENTRICLE SYSTOLIC VOLUME INDEX: 43.4 ML/M2
LEFT VENTRICLE SYSTOLIC VOLUME: 75 ML
LEFT VENTRICULAR INTERNAL DIMENSION IN DIASTOLE: 4.27 CM (ref 3.5–6)
LEFT VENTRICULAR MASS: 146.85 G
LV LATERAL E/E' RATIO: 33.67 M/S
LV SEPTAL E/E' RATIO: 50.5 M/S
LVOT MG: 0.89 MMHG
LVOT MV: 0.43 CM/S
LYMPHOCYTES # BLD AUTO: 1.6 X10(3)/MCL (ref 0.6–4.6)
LYMPHOCYTES NFR BLD AUTO: 24.3 %
MAGNESIUM SERPL-MCNC: 1.9 MG/DL (ref 1.6–2.6)
MCH RBC QN AUTO: 34.1 PG
MCHC RBC AUTO-ENTMCNC: 32.8 G/DL (ref 33–36)
MCV RBC AUTO: 104.2 FL (ref 80–94)
MONOCYTES # BLD AUTO: 0.59 X10(3)/MCL (ref 0.1–1.3)
MONOCYTES NFR BLD AUTO: 9 %
MV MEAN GRADIENT: 2 MMHG
MV PEAK A VEL: 0.01 M/S
MV PEAK E VEL: 1.01 M/S
MV PEAK GRADIENT: 5 MMHG
MV STENOSIS PRESSURE HALF TIME: 46.94 MS
MV VALVE AREA BY CONTINUITY EQUATION: 1.13 CM2
MV VALVE AREA P 1/2 METHOD: 4.69 CM2
NEUTROPHILS # BLD AUTO: 4.29 X10(3)/MCL (ref 2.1–9.2)
NEUTROPHILS NFR BLD AUTO: 65 %
NRBC BLD AUTO-RTO: 0 %
PHOSPHATE SERPL-MCNC: 3.3 MG/DL (ref 2.3–4.7)
PISA AR MAX VEL: 4.9 M/S
PISA MRMAX VEL: 5.93 M/S
PISA TR MAX VEL: 2.99 M/S
PLATELET # BLD AUTO: 204 X10(3)/MCL (ref 130–400)
PMV BLD AUTO: 11 FL (ref 7.4–10.4)
POTASSIUM SERPL-SCNC: 3.8 MMOL/L (ref 3.5–5.1)
PROT SERPL-MCNC: 6 GM/DL (ref 5.8–7.6)
PTH-INTACT SERPL-MCNC: 111.8 PG/ML (ref 8.7–77)
RA PRESSURE: 8 MMHG
RA WIDTH: 5.8 CM
RBC # BLD AUTO: 3.84 X10(6)/MCL (ref 4.2–5.4)
SODIUM SERPL-SCNC: 141 MMOL/L (ref 136–145)
TDI LATERAL: 0.03 M/S
TDI SEPTAL: 0.02 M/S
TDI: 0.03 M/S
TR MAX PG: 36 MMHG
TRICUSPID ANNULAR PLANE SYSTOLIC EXCURSION: 1.14 CM
TV REST PULMONARY ARTERY PRESSURE: 44 MMHG
VIT B12 SERPL-MCNC: 690 PG/ML (ref 213–816)
WBC # SPEC AUTO: 6.6 X10(3)/MCL (ref 4.5–11.5)

## 2023-03-15 PROCEDURE — 84100 ASSAY OF PHOSPHORUS: CPT | Performed by: STUDENT IN AN ORGANIZED HEALTH CARE EDUCATION/TRAINING PROGRAM

## 2023-03-15 PROCEDURE — 25000003 PHARM REV CODE 250: Performed by: INTERNAL MEDICINE

## 2023-03-15 PROCEDURE — 63600175 PHARM REV CODE 636 W HCPCS: Performed by: STUDENT IN AN ORGANIZED HEALTH CARE EDUCATION/TRAINING PROGRAM

## 2023-03-15 PROCEDURE — 96376 TX/PRO/DX INJ SAME DRUG ADON: CPT

## 2023-03-15 PROCEDURE — 25000003 PHARM REV CODE 250: Performed by: STUDENT IN AN ORGANIZED HEALTH CARE EDUCATION/TRAINING PROGRAM

## 2023-03-15 PROCEDURE — 82746 ASSAY OF FOLIC ACID SERUM: CPT | Performed by: STUDENT IN AN ORGANIZED HEALTH CARE EDUCATION/TRAINING PROGRAM

## 2023-03-15 PROCEDURE — 21400001 HC TELEMETRY ROOM

## 2023-03-15 PROCEDURE — 86039 ANTINUCLEAR ANTIBODIES (ANA): CPT | Mod: 90 | Performed by: STUDENT IN AN ORGANIZED HEALTH CARE EDUCATION/TRAINING PROGRAM

## 2023-03-15 PROCEDURE — 86036 ANCA SCREEN EACH ANTIBODY: CPT | Mod: 90 | Performed by: STUDENT IN AN ORGANIZED HEALTH CARE EDUCATION/TRAINING PROGRAM

## 2023-03-15 PROCEDURE — 94761 N-INVAS EAR/PLS OXIMETRY MLT: CPT

## 2023-03-15 PROCEDURE — 82607 VITAMIN B-12: CPT | Performed by: STUDENT IN AN ORGANIZED HEALTH CARE EDUCATION/TRAINING PROGRAM

## 2023-03-15 PROCEDURE — 86235 NUCLEAR ANTIGEN ANTIBODY: CPT | Performed by: STUDENT IN AN ORGANIZED HEALTH CARE EDUCATION/TRAINING PROGRAM

## 2023-03-15 PROCEDURE — 83516 IMMUNOASSAY NONANTIBODY: CPT | Mod: 90 | Performed by: STUDENT IN AN ORGANIZED HEALTH CARE EDUCATION/TRAINING PROGRAM

## 2023-03-15 PROCEDURE — 85025 COMPLETE CBC W/AUTO DIFF WBC: CPT | Performed by: STUDENT IN AN ORGANIZED HEALTH CARE EDUCATION/TRAINING PROGRAM

## 2023-03-15 PROCEDURE — 83970 ASSAY OF PARATHORMONE: CPT | Performed by: STUDENT IN AN ORGANIZED HEALTH CARE EDUCATION/TRAINING PROGRAM

## 2023-03-15 PROCEDURE — 80053 COMPREHEN METABOLIC PANEL: CPT | Performed by: STUDENT IN AN ORGANIZED HEALTH CARE EDUCATION/TRAINING PROGRAM

## 2023-03-15 PROCEDURE — 96365 THER/PROPH/DIAG IV INF INIT: CPT

## 2023-03-15 PROCEDURE — 83735 ASSAY OF MAGNESIUM: CPT | Performed by: STUDENT IN AN ORGANIZED HEALTH CARE EDUCATION/TRAINING PROGRAM

## 2023-03-15 RX ORDER — MAGNESIUM SULFATE 1 G/100ML
1 INJECTION INTRAVENOUS ONCE
Status: COMPLETED | OUTPATIENT
Start: 2023-03-15 | End: 2023-03-15

## 2023-03-15 RX ORDER — DIGOXIN 250 MCG
0.25 TABLET ORAL DAILY
Status: DISCONTINUED | OUTPATIENT
Start: 2023-03-15 | End: 2023-03-15

## 2023-03-15 RX ORDER — POTASSIUM CHLORIDE 20 MEQ/1
40 TABLET, EXTENDED RELEASE ORAL ONCE
Status: COMPLETED | OUTPATIENT
Start: 2023-03-15 | End: 2023-03-15

## 2023-03-15 RX ORDER — METOPROLOL TARTRATE 25 MG/1
25 TABLET, FILM COATED ORAL 3 TIMES DAILY
Status: DISCONTINUED | OUTPATIENT
Start: 2023-03-15 | End: 2023-03-16 | Stop reason: HOSPADM

## 2023-03-15 RX ORDER — FUROSEMIDE 10 MG/ML
40 INJECTION INTRAMUSCULAR; INTRAVENOUS ONCE
Status: COMPLETED | OUTPATIENT
Start: 2023-03-15 | End: 2023-03-15

## 2023-03-15 RX ORDER — DIGOXIN 250 MCG
0.25 TABLET ORAL DAILY
Status: DISCONTINUED | OUTPATIENT
Start: 2023-03-16 | End: 2023-03-16 | Stop reason: HOSPADM

## 2023-03-15 RX ORDER — METOPROLOL TARTRATE 1 MG/ML
5 INJECTION, SOLUTION INTRAVENOUS EVERY 5 MIN PRN
Status: DISCONTINUED | OUTPATIENT
Start: 2023-03-15 | End: 2023-03-16 | Stop reason: HOSPADM

## 2023-03-15 RX ADMIN — METOPROLOL TARTRATE 5 MG: 1 INJECTION, SOLUTION INTRAVENOUS at 01:03

## 2023-03-15 RX ADMIN — METOPROLOL TARTRATE 25 MG: 25 TABLET, FILM COATED ORAL at 03:03

## 2023-03-15 RX ADMIN — FUROSEMIDE 40 MG: 10 INJECTION, SOLUTION INTRAMUSCULAR; INTRAVENOUS at 03:03

## 2023-03-15 RX ADMIN — RIVAROXABAN 20 MG: 10 TABLET, FILM COATED ORAL at 05:03

## 2023-03-15 RX ADMIN — MAGNESIUM SULFATE IN DEXTROSE 1 G: 10 INJECTION, SOLUTION INTRAVENOUS at 08:03

## 2023-03-15 RX ADMIN — METOPROLOL SUCCINATE 25 MG: 25 TABLET, EXTENDED RELEASE ORAL at 08:03

## 2023-03-15 RX ADMIN — POTASSIUM CHLORIDE 40 MEQ: 1500 TABLET, EXTENDED RELEASE ORAL at 08:03

## 2023-03-15 RX ADMIN — METOPROLOL TARTRATE 25 MG: 25 TABLET, FILM COATED ORAL at 10:03

## 2023-03-15 RX ADMIN — METOPROLOL TARTRATE 5 MG: 1 INJECTION, SOLUTION INTRAVENOUS at 11:03

## 2023-03-15 NOTE — MEDICAL/APP STUDENT
[facility] Medicine Wards   Progress Note     Resident Team: [facility] Medicine List {#:[id number]}  Attending Physician: Betty Reed MD  Resident: ***  Intern: ***     Subjective:      Brief HPI:  77 year-old female presented to the Emergency Department on 03/13/2023 with 10-day history of intermittent SOB and chest tightness. 10 days ago, she was awakened from sleep due to these symptoms, which were not resolved despite sitting up and taking deep breaths. The patient noticed that the symptoms were worsened, along with a dry cough and exertional fatigue over the next week. She denied similar symptoms in the past. She has a PMH of HTN, RBBB (presenton EKG from 2004), and atrial fibrillation for 6 years (currently follows with Dr. Crystal).    In the ED, vitals were:T 97.9F, /109, , RR 18, SpO2 96% on RA. ED workup indicated nonischemic EKG, negative troponin 0.021, mildly elevated d dimer, elevated calcium 10.8, elevated BNP 1283, subtherapeutic digoxin level 0.4, and XR Chest concerning for pulmonary vascular congestion and cardiomegaly.    Today: The patient denies fatigue, dry cough, headache, abdominal pain, diarrhea, constipation, dysuria,and hematuria. Lab indicates unremarkable calcium 10.0, TSH and T4. Urine output is 300 mL overnight. Awaiting cardiology    Interval History: ***      Review of Systems:  Pertinent ROS negative unless otherwise stated above.       Objective:     Vital Signs (Most Recent):  Temp: 98.9 °F (37.2 °C) (03/15/23 0407)  Pulse: (!) 116 (03/15/23 0407)  Resp: 20 (03/14/23 1725)  BP: 128/77 (03/15/23 0407)  SpO2: 95 % (03/15/23 0407)   Vital Signs (24h Range):  Temp:  [97.9 °F (36.6 °C)-98.9 °F (37.2 °C)] 98.9 °F (37.2 °C)  Pulse:  [] 116  Resp:  [18-20] 20  SpO2:  [90 %-96 %] 95 %  BP: (114-149)/() 128/77       Physical Examination:  General: Patient sleeping comfortably, in no acute distress   Eye: pupils equal, clear conjunctiva, eyelids normal  HENT: Head-normocephalic and  "atraumatic  Neck: full range of motion, trachea midline, supple  Respiratory: CTAB with mild crackles at bilateral lung bases.  Cardiovascular: irregular rate and rhythm without murmurs.  No gallops or rubs, no JVD.   Gastrointestinal: soft, non-tender, non-distended with normal bowel sounds, without masses to palpation  Musculoskeletal: full range of motion of all extremities/spine without limitation or discomfort  Integumentary: no rashes or skin lesions present  Neurologic: no signs of peripheral neurological deficit, motor/sensory function intact  Psychiatric:  alert and oriented, cognitive function intact, cooperative with exam      Laboratory:  Trended Lab Data:  Recent Labs   Lab 03/14/23  1448 03/15/23  0327   WBC 7.4 6.6   HGB 15.8 13.1   HCT 48.0* 40.0    204   .9* 104.2*   RDW 13.6 13.5    141   K 3.8 3.8   CO2 26 25   BUN 11.0 13.6   CREATININE 0.80 0.75   ALBUMIN 4.3 3.5   BILITOT 1.6* 1.4   AST 29 24   ALKPHOS 96 78   ALT 29 25       Microbiology Data Reviewed: {yes/no:19897}  Pertinent Findings:  ***    Other Results:  EKG (my interpretation): {ekg findings:337168:"normal EKG, normal sinus rhythm","unchanged from previous tracings"}.    Radiology:  No new imaging.        Intake/Output Summary (Last 24 hours) at 3/15/2023 0639  Last data filed at 3/14/2023 1700  Gross per 24 hour   Intake --   Output 300 ml   Net -300 ml         Assessment & Plan:     ***) ***       - ***       - ***       - ***       - ***    ***) ***       - ***       - ***       - ***       - ***    ***) ***       - ***       - ***       - ***       - ***      CODE STATUS: { Code Status:35302}  Access: {IV access:[id number]}  Antibiotics: ***  Diet: {Diet (All options):36638}  DVT Prophylaxis: {DVT PPx:58439}  GI Prophylaxis: {meds:20380}  Fluids: {iv fluids:245526} *** ml/hr.     Disposition: ***        Kimber Peterson, DO  \[facility]\"" Internal Medicine  HO-1       "

## 2023-03-15 NOTE — PROGRESS NOTES
"Inpatient Nutrition Evaluation    Admit Date: 3/14/2023   Total duration of encounter: 1 day    Nutrition Recommendation/Prescription     Continue heart healthy diet  Pt education on diet/complete  MVI/fe  Biweekly wt  Will monitor nutrition status     Nutrition Assessment     Chart Review    Reason Seen: continuous nutrition monitoring    Malnutrition Screening Tool Results   Have you recently lost weight without trying?: No  Have you been eating poorly because of a decreased appetite?: No   MST Score: 0     Diagnosis:Afib/ RVR, new CHF, HTN, hypercalcemia       Relevant Medical History: HTN, RBBB, afib     Nutrition-Related Medications: digoxin     Nutrition-Related Labs:  (H/H) 13.1/40.0 Gluc 103 Bun 13.6 Cr 0.7 K 3.8 Alb 3.5     Diet Order: heart healthy   Oral Supplement Order: none  Appetite/Oral Intake: good/% of meals  Factors Affecting Nutritional Intake: none identified  Food/Latter day/Cultural Preferences: none reported  Food Allergies: none reported       Wound(s):   none    Comments    (3/15) pt reported she is eating well; on low salt diet at home; no wt loss; current diet tx appropriate. Pt education provided on diet.     Anthropometrics    Height: 5' 2" (157.5 cm) Height Method: Stated  Last Weight: 71.7 kg (158 lb) (03/15/23 0700) Weight Method: Standard Scale  BMI (Calculated): 28.9  BMI Classification: overweight (BMI 25-29.9)     Ideal Body Weight (IBW), Female: 110 lb     % Ideal Body Weight, Female (lb): 143.64 %                    Usual Body Weight (UBW), k.7 kg  % Usual Body Weight: 100.16     Usual Weight Provided By: patient and EMR weight history    Wt Readings from Last 3 Encounters:   03/15/23 0700 71.7 kg (158 lb)   23 1605 72 kg (158 lb 11.7 oz)   23 1348 72 kg (158 lb 11.7 oz)   23 1220 72.6 kg (160 lb)   23 1451 72.7 kg (160 lb 3.2 oz)      Weight Change(s) Since Admission:  Admit Weight: 72 kg (158 lb 11.7 oz) (23 1348)  No wt loss "     Patient Education    Education Provided: heart healthy diet  Teaching Method: explanation and printed materials  Comprehension: verbalizes understanding  Barriers to Learning: none evident  Expected Compliance: fair  Comments: All questions were answered and dietitian's contact information was provided.     Monitoring & Evaluation     Dietitian will monitor food and beverage intake, weight, and food/nutrition knowledge skill.  Nutrition Risk/Follow-Up: low (follow-up in 5-7 days)  Patients assigned 'low nutrition risk' status do not qualify for a full nutritional assessment but will be monitored and re-evaluated in a 5-7 day time period. Please consult if re-evaluation needed sooner.

## 2023-03-15 NOTE — PROGRESS NOTES
[facility] Medicine Wards   Progress Note     Resident Team: [facility] Medicine List 2  Attending Physician: Betty Reed MD  Resident: Orlin  Intern: Milton     Subjective:      Brief HPI:  Lynette Hernandez is a 77 y.o. female who has a PMH of hypertension, RBBB (present on EKG from 2004), and atrial fibrillation for 6 years (follows with Dr. Crystal). The patient presented to [facility] ED on 3/14/2023 with a primary complaint of SOB and chest heaviness for 1 week. Patient initially presented to Urgent Care but was sent to ED because of HR in 140s. Patient repots onset of chest heaviness and shortness of breath about 10 days ago on Saturday evening when she was awakened from sleep due to these symptoms. Symptoms did not resolve with deep breaths and took several minutes to resolve. Patient states she then noticed her breathing was better and would worsen during these episodes intermittently for the next week. She denies similar episodes in the past. States she has also noticed she becomes fatigued more easily with exertion and has a dry cough which all developed about 1 week ago. Denies fever, chills, n/v, dysuria, diarrhea, constipation. Denies any recent changes to medication. Upon arrival to ED, vitals were as follows: T 97.9F, /109, , RR 18, SpO2 96% on RA. ED workup significant for negative troponin 0.021, mildly elevated d dimer, non-ischemic EKG, elevated BNP 1283, subtherapeutic digoxin level 0.4, and XR Chest concerning for pulmonary vascular congestion. Admitted to Internal Medicine for management of AF RVR and further workup for possible new onset CHF.    Interval History: Patient reports 2 minutes of strange sensation in head and chest this morning after she repositioned herself onto her pillow. No other complaints. Has voided twice overnight, but these were not measured.     Review of Systems:  Pertinent ROS negative unless otherwise stated above.       Objective:     Vital Signs (Most Recent):  Temp: 98.9 °F  (37.2 °C) (03/15/23 0407)  Pulse: (!) 116 (03/15/23 0407)  Resp: 20 (03/14/23 1725)  BP: 128/77 (03/15/23 0700)  SpO2: (!) 94 % (03/15/23 0700)   Vital Signs (24h Range):  Temp:  [97.9 °F (36.6 °C)-98.9 °F (37.2 °C)] 98.9 °F (37.2 °C)  Pulse:  [] 116  Resp:  [18-20] 20  SpO2:  [90 %-96 %] 94 %  BP: (114-149)/() 128/77       Physical Examination:  General: Patient resting comfortably, in no acute distress   HEENT: Head-normocephalic and atraumatic, EOMI, MMM  Respiratory: clear to auscultation bilaterally and upper lung fields, decreased breath sounds with mild fine crackles at bilateral bases  Cardiovascular: irregular rate and rhythm without murmurs.  No gallops or rubs, no JVD.    Gastrointestinal: soft, non-tender, non-distended with normal bowel sounds, without masses to palpation  Musculoskeletal: full range of motion of all extremities/spine without limitation or discomfort, mild left ankle edema, no LE edema  Integumentary: no rashes or skin lesions present  Neurologic: no signs of peripheral neurological deficit, motor/sensory function intact  Psychiatric:  alert and oriented, cognitive function intact, cooperative with exam      Laboratory:  Trended Lab Data:  Recent Labs   Lab 03/14/23  1448 03/15/23  0327   WBC 7.4 6.6   HGB 15.8 13.1   HCT 48.0* 40.0    204   .9* 104.2*   RDW 13.6 13.5    141   K 3.8 3.8   CO2 26 25   BUN 11.0 13.6   CREATININE 0.80 0.75   ALBUMIN 4.3 3.5   BILITOT 1.6* 1.4   AST 29 24   ALKPHOS 96 78   ALT 29 25     Other Results:  EKG (my interpretation): non-ischemic, atrial fibrillation    Radiology:  Imaging Results              X-Ray Chest AP Portable (Final result)  Result time 03/14/23 15:13:54      Final result by Jay Oneal MD (03/14/23 15:13:54)                   Impression:      Interstitial predominant opacities with small pleural effusions, suspect pulmonary vascular congestion.      Electronically signed by: Jay  Oneal  Date:    03/14/2023  Time:    15:13               Narrative:    EXAMINATION:  XR CHEST AP PORTABLE    CLINICAL HISTORY:  Chest Pain;    COMPARISON:  27 February 2018    FINDINGS:  Portable frontal view of the chest was obtained. Cardiac silhouette is enlarged.  There are bilateral interstitial predominant opacities.  Suspect small pleural effusions.  No pneumothorax.                                         Intake/Output Summary (Last 24 hours) at 3/15/2023 0824  Last data filed at 3/14/2023 1700  Gross per 24 hour   Intake --   Output 300 ml   Net -300 ml         Assessment & Plan:     AF RVR  -Hx of A Fib x 6 years  -Home regimen: digoxin 0.125mg daily, Toprol 25mg daily, Xarelto 20mg daily  -HR up to 130s in ED, treated with IV metoprolol x 2  -Come home digoxin, xarelto  -Tele monitoring  -Continue metoprolol to 25mg BID      Possible new onset CHF  -Dry cough, SOB, CARUSO x 1 week  -XR Chest with b/l interstitial predominant opacities, small pleural effusions, suspected pulmonary vascular congestion  -COVID/FLU negative  -Given lasix 20mg IV x 1 in ED  -Trop negative at 0.021, BNP 1283.1  -Follow up ECHO today then Cardiology consult if EF reduced  -Diurese with Lasix 40mg IV x 1  -Strict I's and O's     Hypertension  -BP up to 149/107 in ED, /77 this morning  -Toprol for AF increased to 25mg BID  -PRN antihypertensives     Hypercalcemia  -Initial Ca 10.8, unchanged overnight  -Elevated PTH  -Refer to Endocrinology outpatient       CODE STATUS: Full Code  Access: Peripheral  Antibiotics: None  Diet: Cardiac  DVT Prophylaxis: Xarelto, SCDs  GI Prophylaxis: None  Fluids: None     Dispo: continue inpatient stay, follow up ECHO, discharge pending Cardiology further workup      Marielena Rose MD  [facility] Medicine, HO-1  3/15/2023

## 2023-03-15 NOTE — PLAN OF CARE
03/15/23 1013   Discharge Assessment   Assessment Type Discharge Planning Assessment   Confirmed/corrected address, phone number and insurance Yes   Confirmed Demographics Correct on Facesheet   Source of Information patient   When was your last doctors appointment?   (Fawad Reinoso)   Reason For Admission Acute pulmonary edema, Atrial fib with rapid ventricular response, Chest pain   People in Home alone   Facility Arrived From: Home   Do you expect to return to your current living situation? Yes   Do you have help at home or someone to help you manage your care at home? No   Prior to hospitilization cognitive status: Alert/Oriented   Current cognitive status: Alert/Oriented   Equipment Currently Used at Home none   Readmission within 30 days? No   Patient currently being followed by outpatient case management? No   Do you currently have service(s) that help you manage your care at home? No   Do you take prescription medications? Yes  (Ian Huerta)   Do you have prescription coverage? Yes   Coverage [employer]   Do you have any problems affording any of your prescribed medications? No   Is the patient taking medications as prescribed? yes   Who is going to help you get home at discharge? Family   How do you get to doctors appointments? car, drives self   Are you on dialysis? No   Discharge Plan A Home   DME Needed Upon Discharge  none   Discharge Plan discussed with: Patient   Discharge Barriers Identified None   Physical Activity   On average, how many days per week do you engage in moderate to strenuous exercise (like a brisk walk)? 0 days   On average, how many minutes do you engage in exercise at this level? 0 min   Housing Stability   In the last 12 months, how many places have you lived? 1   In the last 12 months, was there a time when you did not have a steady place to sleep or slept in a shelter (including now)? N   Transportation Needs   In the past 12 months, has lack of transportation kept you  from medical appointments or from getting medications? no   In the past 12 months, has lack of transportation kept you from meetings, work, or from getting things needed for daily living? No   Food Insecurity   Within the past 12 months, you worried that your food would run out before you got the money to buy more. Never true   Social Connections   In a typical week, how many times do you talk on the phone with family, friends, or neighbors? More than 3   How often do you get together with friends or relatives? Once   How often do you attend Shinto or Yazdanism services? More than 4   Do you belong to any clubs or organizations such as Shinto groups, unions, fraternal or athletic groups, or school groups? Yes   How often do you attend meetings of the clubs or organizations you belong to? More than 4   Are you , , , , never , or living with a partner?    Alcohol Use   Q1: How often do you have a drink containing alcohol? 2-4 pr month   Q2: How many drinks containing alcohol do you have on a typical day when you are drinking? 1 or 2   Q3: How often do you have six or more drinks on one occasion? Never     Pt  with 2 children residing alone; Independent with ADL's; Pt is retired  and receives pension; Emergency contact: Arpita RIVAS  ([phone]) employee at [employer]. Records requested from Dr. Francisco Crystal's office ([phone]). Will follow for d/c planning needs.

## 2023-03-15 NOTE — PLAN OF CARE
Problem: Adult Inpatient Plan of Care  Goal: Plan of Care Review  Outcome: Ongoing, Progressing  Flowsheets (Taken 3/15/2023 9031)  Plan of Care Reviewed With:   patient   family  Goal: Patient-Specific Goal (Individualized)  Outcome: Ongoing, Progressing  Goal: Absence of Hospital-Acquired Illness or Injury  Outcome: Ongoing, Progressing  Goal: Optimal Comfort and Wellbeing  Outcome: Ongoing, Progressing  Goal: Readiness for Transition of Care  Outcome: Ongoing, Progressing     Problem: Fall Injury Risk  Goal: Absence of Fall and Fall-Related Injury  Outcome: Ongoing, Progressing     Problem: Adjustment to Illness (Heart Failure)  Goal: Optimal Coping  Outcome: Ongoing, Progressing     Problem: Cardiac Output Decreased (Heart Failure)  Goal: Optimal Cardiac Output  Outcome: Ongoing, Progressing     Problem: Dysrhythmia (Heart Failure)  Goal: Stable Heart Rate and Rhythm  Outcome: Ongoing, Progressing     Problem: Fluid Imbalance (Heart Failure)  Goal: Fluid Balance  Outcome: Ongoing, Progressing     Problem: Functional Ability Impaired (Heart Failure)  Goal: Optimal Functional Ability  Outcome: Ongoing, Progressing     Problem: Oral Intake Inadequate (Heart Failure)  Goal: Optimal Nutrition Intake  Outcome: Ongoing, Progressing     Problem: Respiratory Compromise (Heart Failure)  Goal: Effective Oxygenation and Ventilation  Outcome: Ongoing, Progressing     Problem: Sleep Disordered Breathing (Heart Failure)  Goal: Effective Breathing Pattern During Sleep  Outcome: Ongoing, Progressing

## 2023-03-15 NOTE — CONSULTS
"Cardiology Consult Note     Cardiology Attending: Dr. Navas  Cardiology Fellow: Nazario Brush MD     Date of Admit: 3/14/2023  Date of Consult: 3/15/2023    Reason for Consultation:     "Afib RVR, HFrEF"    History of Present Illness:      Lynette Hernandez is a 77 y.o. female with a PMH significant for paroxysmal afib on xarelto who presented to the ED for shortness of breath, CARUSO and chest pain for 1 week.  Patient initially developed these symptoms around [place] time.  They lasted 2 months and improved then worsened again 1 week ago.  She has a history of afib for 6 years but reports HR has been stable for years and has never required hospital for afib. Follows with Dr. Crystal for cardiology.  Denies palpitations, syncope, peripheral edema.    Past Medical History:     Past Medical History:   Diagnosis Date    Anticoagulant long-term use     Atrial fibrillation     Hypertension      Surgical History:   History reviewed. No pertinent surgical history.  Allergies:   Review of patient's allergies indicates:  No Known Allergies  Family History:     Family History   Problem Relation Age of Onset    Lung cancer Mother     Heart disease Father     Heart disease Brother      Social History:     Social History     Tobacco Use    Smoking status: Never     Passive exposure: Past    Smokeless tobacco: Never   Substance Use Topics    Alcohol use: Yes     Alcohol/week: 1.0 standard drink     Types: 1 Glasses of wine per week    Drug use: Never     Review of Systems:     Review of Systems   Constitutional:  Positive for malaise/fatigue. Negative for chills and fever.   HENT:  Negative for hearing loss and sore throat.    Eyes:  Negative for blurred vision, pain and redness.   Respiratory:  Positive for shortness of breath. Negative for cough and wheezing.    Cardiovascular:  Positive for chest pain. Negative for palpitations, orthopnea, leg swelling and PND.   Gastrointestinal:  Negative for abdominal pain, constipation, " "diarrhea, nausea and vomiting.   Musculoskeletal:  Negative for back pain, joint pain and myalgias.   Neurological:  Negative for dizziness, sensory change and focal weakness.   Medications:     Home Medications:  Prior to Admission medications    Medication Sig Start Date End Date Taking? Authorizing Provider   digoxin (LANOXIN) 125 mcg tablet Take 1 tablet by mouth Daily. 8/29/22  Yes Historical Provider   metoprolol succinate (TOPROL-XL) 25 MG 24 hr tablet Take 1 tablet by mouth once daily. 8/29/22  Yes Historical Provider   XARELTO 20 mg Tab Take 1 tablet by mouth once daily. 9/8/22  Yes Historical Provider       Current/Inpatient Medications:  Infusions:    Scheduled:   [START ON 3/16/2023] digoxin  0.25 mg Oral Daily    metoprolol tartrate  25 mg Oral TID    rivaroxaban  20 mg Oral Daily with dinner     PRN:  melatonin, metoprolol, sodium chloride 0.9%    Objective:     24-hour Vitals:   Temp:  [98.2 °F (36.8 °C)-98.9 °F (37.2 °C)] 98.2 °F (36.8 °C)  Pulse:  [] 108  Resp:  [18-20] 18  SpO2:  [90 %-98 %] 98 %  BP: (114-149)/() 125/86    Vitals: /86   Pulse 108   Temp 98.2 °F (36.8 °C)   Resp 18   Ht 5' 2" (1.575 m)   Wt 71.7 kg (158 lb)   SpO2 98%   Breastfeeding No   BMI 28.90 kg/m²     Intake/Output Summary (Last 24 hours) at 3/15/2023 1523  Last data filed at 3/14/2023 1700  Gross per 24 hour   Intake --   Output 300 ml   Net -300 ml       Physical Exam  Vitals and nursing note reviewed.   Constitutional:       General: She is not in acute distress.     Appearance: Normal appearance.   HENT:      Head: Normocephalic and atraumatic.      Mouth/Throat:      Mouth: Mucous membranes are moist.      Pharynx: Oropharynx is clear. No posterior oropharyngeal erythema.   Eyes:      General: No scleral icterus.  Cardiovascular:      Rate and Rhythm: Tachycardia present. Rhythm irregular.      Heart sounds: Normal heart sounds. No murmur heard.  Pulmonary:      Effort: Pulmonary effort is " normal.      Breath sounds: Rales present. No wheezing or rhonchi.   Abdominal:      General: Bowel sounds are normal. There is no distension.      Palpations: Abdomen is soft.      Tenderness: There is no abdominal tenderness.   Musculoskeletal:         General: No swelling or tenderness. Normal range of motion.      Right lower leg: No edema.      Left lower leg: No edema.   Skin:     General: Skin is warm and dry.      Capillary Refill: Capillary refill takes less than 2 seconds.      Coloration: Skin is not jaundiced.   Neurological:      General: No focal deficit present.      Mental Status: She is alert and oriented to person, place, and time.      Motor: No weakness.      Labs:   I have reviewed the following labs below:    Recent Labs   Lab 03/14/23  1448 03/14/23  1544 03/15/23  0327   WBC 7.4  --  6.6   HGB 15.8  --  13.1   HCT 48.0*  --  40.0     --  204     --  141   K 3.8  --  3.8   CO2 26  --  25   BUN 11.0  --  13.6   CREATININE 0.80  --  0.75   CALCIUM 10.8*  --  10.0   MG 2.30  --  1.90   PHOS 2.9  --  3.3   ALBUMIN 4.3  --  3.5   BILITOT 1.6*  --  1.4   AST 29  --  24   ALT 29  --  25   ALKPHOS 96  --  78   TROPONINI 0.021  --   --    BNP  --  1,283.1*  --      Cardiovascular Studies/Imaging:   I have reviewed the following studies below:    ECG (3/15/2023):  Afib, RVR, RBBB    TTE (3/15/2023):   EF 20-25%  Mild MR, mild AI, moderate TR  Awaiting final read    Imaging:   X-Ray Chest AP Portable    Result Date: 3/14/2023  EXAMINATION: XR CHEST AP PORTABLE CLINICAL HISTORY: Chest Pain; COMPARISON: 27 February 2018 FINDINGS: Portable frontal view of the chest was obtained. Cardiac silhouette is enlarged.  There are bilateral interstitial predominant opacities.  Suspect small pleural effusions.  No pneumothorax.     Interstitial predominant opacities with small pleural effusions, suspect pulmonary vascular congestion. Electronically signed by: Jay Oneal Date:    03/14/2023  Time:    15:13    Assessment:     77F with afib RVR and newly found depressed EF on echo.    Plan/Recommendations:     - HR remains elevated  - increase digoxin to 250mcg daily since her dig level was subtherapeutic  - increase beta blocker - can use lopressor 25mg TID while hospitalized and transition to succinate upon discharge  - would benefit form further diuresis - lasix 40 iv to be given  - consider adding Entresto tomorrow for further GDMT  - continue xarelto for CVA ppx  - ultimately would recommend SINDI/DCCV however SINDI is not available at this time and it can be planned for on an outpatient basis  - repeat TTE once in NSR or rate controlled for several months to assess for improvement in EF    Discussed with Dr. Yosef Brush MD  [employer] Cardiology Fellow, PGY-4  03/15/2023 3:23 PM

## 2023-03-16 VITALS
BODY MASS INDEX: 29.08 KG/M2 | HEART RATE: 110 BPM | SYSTOLIC BLOOD PRESSURE: 115 MMHG | DIASTOLIC BLOOD PRESSURE: 76 MMHG | OXYGEN SATURATION: 96 % | HEIGHT: 62 IN | TEMPERATURE: 98 F | WEIGHT: 158 LBS | RESPIRATION RATE: 20 BRPM

## 2023-03-16 LAB
ALBUMIN SERPL-MCNC: 3.3 G/DL (ref 3.4–4.8)
ALBUMIN/GLOB SERPL: 1.3 RATIO (ref 1.1–2)
ALP SERPL-CCNC: 77 UNIT/L (ref 40–150)
ALT SERPL-CCNC: 25 UNIT/L (ref 0–55)
AST SERPL-CCNC: 24 UNIT/L (ref 5–34)
BASOPHILS # BLD AUTO: 0.05 X10(3)/MCL (ref 0–0.2)
BASOPHILS NFR BLD AUTO: 0.9 %
BILIRUBIN DIRECT+TOT PNL SERPL-MCNC: 1.3 MG/DL
BUN SERPL-MCNC: 14.5 MG/DL (ref 9.8–20.1)
CALCIUM SERPL-MCNC: 9.7 MG/DL (ref 8.4–10.2)
CHLORIDE SERPL-SCNC: 106 MMOL/L (ref 98–107)
CO2 SERPL-SCNC: 26 MMOL/L (ref 23–31)
CREAT SERPL-MCNC: 0.85 MG/DL (ref 0.55–1.02)
EOSINOPHIL # BLD AUTO: 0.12 X10(3)/MCL (ref 0–0.9)
EOSINOPHIL NFR BLD AUTO: 2.2 %
ERYTHROCYTE [DISTWIDTH] IN BLOOD BY AUTOMATED COUNT: 13.4 % (ref 11.5–17)
GFR SERPLBLD CREATININE-BSD FMLA CKD-EPI: >60 MLS/MIN/1.73/M2
GLOBULIN SER-MCNC: 2.6 GM/DL (ref 2.4–3.5)
GLUCOSE SERPL-MCNC: 87 MG/DL (ref 82–115)
HCT VFR BLD AUTO: 39.2 % (ref 37–47)
HGB BLD-MCNC: 13.1 G/DL (ref 12–16)
IMM GRANULOCYTES # BLD AUTO: 0.01 X10(3)/MCL (ref 0–0.04)
IMM GRANULOCYTES NFR BLD AUTO: 0.2 %
LYMPHOCYTES # BLD AUTO: 2.11 X10(3)/MCL (ref 0.6–4.6)
LYMPHOCYTES NFR BLD AUTO: 39.1 %
MAGNESIUM SERPL-MCNC: 2 MG/DL (ref 1.6–2.6)
MCH RBC QN AUTO: 34.5 PG
MCHC RBC AUTO-ENTMCNC: 33.4 G/DL (ref 33–36)
MCV RBC AUTO: 103.2 FL (ref 80–94)
MONOCYTES # BLD AUTO: 0.54 X10(3)/MCL (ref 0.1–1.3)
MONOCYTES NFR BLD AUTO: 10 %
NEUTROPHILS # BLD AUTO: 2.56 X10(3)/MCL (ref 2.1–9.2)
NEUTROPHILS NFR BLD AUTO: 47.6 %
NRBC BLD AUTO-RTO: 0 %
PHOSPHATE SERPL-MCNC: 3.3 MG/DL (ref 2.3–4.7)
PLATELET # BLD AUTO: 207 X10(3)/MCL (ref 130–400)
PMV BLD AUTO: 11.3 FL (ref 7.4–10.4)
POTASSIUM SERPL-SCNC: 3.7 MMOL/L (ref 3.5–5.1)
PROT SERPL-MCNC: 5.9 GM/DL (ref 5.8–7.6)
RBC # BLD AUTO: 3.8 X10(6)/MCL (ref 4.2–5.4)
SODIUM SERPL-SCNC: 141 MMOL/L (ref 136–145)
WBC # SPEC AUTO: 5.4 X10(3)/MCL (ref 4.5–11.5)

## 2023-03-16 PROCEDURE — 84100 ASSAY OF PHOSPHORUS: CPT | Performed by: STUDENT IN AN ORGANIZED HEALTH CARE EDUCATION/TRAINING PROGRAM

## 2023-03-16 PROCEDURE — 25000003 PHARM REV CODE 250: Performed by: STUDENT IN AN ORGANIZED HEALTH CARE EDUCATION/TRAINING PROGRAM

## 2023-03-16 PROCEDURE — 83735 ASSAY OF MAGNESIUM: CPT | Performed by: STUDENT IN AN ORGANIZED HEALTH CARE EDUCATION/TRAINING PROGRAM

## 2023-03-16 PROCEDURE — 80053 COMPREHEN METABOLIC PANEL: CPT | Performed by: STUDENT IN AN ORGANIZED HEALTH CARE EDUCATION/TRAINING PROGRAM

## 2023-03-16 PROCEDURE — 85025 COMPLETE CBC W/AUTO DIFF WBC: CPT | Performed by: STUDENT IN AN ORGANIZED HEALTH CARE EDUCATION/TRAINING PROGRAM

## 2023-03-16 PROCEDURE — 94761 N-INVAS EAR/PLS OXIMETRY MLT: CPT

## 2023-03-16 RX ORDER — METOPROLOL SUCCINATE 25 MG/1
25 TABLET, EXTENDED RELEASE ORAL 2 TIMES DAILY
Qty: 60 TABLET | Refills: 2 | Status: SHIPPED | OUTPATIENT
Start: 2023-03-16 | End: 2023-03-16 | Stop reason: SDUPTHER

## 2023-03-16 RX ORDER — DIGOXIN 250 MCG
0.25 TABLET ORAL DAILY
Qty: 30 TABLET | Refills: 11 | Status: SHIPPED | OUTPATIENT
Start: 2023-03-16 | End: 2023-03-16 | Stop reason: SDUPTHER

## 2023-03-16 RX ORDER — DIGOXIN 250 MCG
0.25 TABLET ORAL DAILY
Qty: 30 TABLET | Refills: 11 | Status: SHIPPED | OUTPATIENT
Start: 2023-03-16 | End: 2023-07-03 | Stop reason: SDUPTHER

## 2023-03-16 RX ORDER — POTASSIUM CHLORIDE 20 MEQ/1
40 TABLET, EXTENDED RELEASE ORAL ONCE
Status: COMPLETED | OUTPATIENT
Start: 2023-03-16 | End: 2023-03-16

## 2023-03-16 RX ORDER — SACUBITRIL AND VALSARTAN 24; 26 MG/1; MG/1
1 TABLET, FILM COATED ORAL 2 TIMES DAILY
Qty: 60 TABLET | Refills: 1 | Status: SHIPPED | OUTPATIENT
Start: 2023-03-16 | End: 2023-05-15

## 2023-03-16 RX ORDER — METOPROLOL SUCCINATE 25 MG/1
25 TABLET, EXTENDED RELEASE ORAL 2 TIMES DAILY
Qty: 60 TABLET | Refills: 2 | Status: SHIPPED | OUTPATIENT
Start: 2023-03-16 | End: 2023-07-03 | Stop reason: SDUPTHER

## 2023-03-16 RX ADMIN — DIGOXIN 0.25 MG: 250 TABLET ORAL at 08:03

## 2023-03-16 RX ADMIN — METOPROLOL TARTRATE 25 MG: 25 TABLET, FILM COATED ORAL at 08:03

## 2023-03-16 RX ADMIN — POTASSIUM CHLORIDE 40 MEQ: 1500 TABLET, EXTENDED RELEASE ORAL at 06:03

## 2023-03-16 NOTE — MEDICAL/APP STUDENT
[facility] Medicine Wards   Progress Note     Resident Team: [facility] Medicine List {#:[id number]}  Attending Physician: Betty Reed MD  Resident: ***  Intern: ***     Subjective:      Brief HPI:  Ms. Ojeda with the PMHx of HTN, RBBB (on EKG from 2004), and atrial fibrillation (managed by Dr. Crystal) who presented to the ED on 03/14 with 1 week onset of intermittent SOB, dry coughing, and fatigue. Denied fever, chillsn/v, chest pain, constipation, diarrhea, dysuria. ED work up revealed troponin 0.021, mildly elevated D-dimer, non-ischemic EKG, elevated BNP at 1283, subtherapeutic digoxin 0.4, Chest Xray revealing increased interistial markings. She was admitted to Internal Medicine and consulted with Cardiology.     Echocardiogram on 03/15/2023 showed EF of 20%. Cardiology suggested as follows:  - Increase digoxin to 250 mcg daily (due to elevated HR and subtherapeutic dose of digoxin)  - Increase metoprolol to 25 mg TIB, lasix 40 IV (to reduced edema of lower extrmities)  - Continue Xaltero  - Recommend adding Enestro.     Interval History: HR went up to 155 overnight when patient got up and went to the bathroom; however, her HR yesterday ranged from . Patient reported having chest tightness ~ 5 times yesterday, which lasted for 5-10 minutes each time. She noticed fatigue, SOB and dry mouth after a period of talking; however, denies exertional SOB. Patient does not remember whether she is still coughing. Denies abdominal pain, abnormal bowel movements, dysuria, HA, or vision issues.         Review of Systems:  Pertinent ROS negative unless otherwise stated above.       Objective:     Vital Signs (Most Recent):  Temp: 97.9 °F (36.6 °C) (03/16/23 0744)  Pulse: 110 (03/16/23 0744)  Resp: 20 (03/16/23 0744)  BP: 115/76 (03/16/23 0744)  SpO2: 96 % (03/16/23 0744) Vital Signs (24h Range):  Temp:  [97.7 °F (36.5 °C)-98.4 °F (36.9 °C)] 97.9 °F (36.6 °C)  Pulse:  [] 110  Resp:  [18-20] 20  SpO2:  [93 %-98 %] 96 %  BP:  (115-136)/(76-87) 115/76       Physical Examination:  General: Patient resting comfortably, in no acute distress   Eye:clear conjunctiva, eyelids normal  HENT: Head-normocephalic and atraumatic  Neck: full range of motion, trachea midline, supple  Respiratory: clear to auscultation bilaterally without wheezes, rales, rhonchi  Cardiovascular: irregular rate and rhythm without murmurs.  No gallops or rubs, no JVD.    Gastrointestinal: soft, non-tender, non-distended with normal bowel sounds, without masses to palpation  Musculoskeletal: trace edema to bilateral lower extremities, improved left ankle and right dorsal foot swelling  Integumentary: multiple cherry angiomas and seborrheic keratosis on the back  Neurologic: no signs of peripheral neurological deficit, motor/sensory function intact  Psychiatric:  alert and oriented, cognitive function intact, cooperative with exam      Laboratory:  Trended Lab Data:  Recent Labs   Lab 03/14/23  1448 03/15/23  0327 03/16/23  0328   WBC 7.4 6.6 5.4   HGB 15.8 13.1 13.1   HCT 48.0* 40.0 39.2    204 207   .9* 104.2* 103.2*   RDW 13.6 13.5 13.4    141 141   K 3.8 3.8 3.7   CO2 26 25 26   BUN 11.0 13.6 14.5   CREATININE 0.80 0.75 0.85   ALBUMIN 4.3 3.5 3.3*   BILITOT 1.6* 1.4 1.3   AST 29 24 24   ALKPHOS 96 78 77   ALT 29 25 25     Calcium trending down from 10.8 to 9.7, Albumin 3.3 (L), .8 (H)      Other Results:  EKG (my interpretation): no new EKG.    Radiology: No new imaging.      Intake/Output Summary (Last 24 hours) at 3/16/2023 0833  Last data filed at 3/15/2023 1808  Gross per 24 hour   Intake --   Output 1100 ml   Net -1100 ml         Assessment & Plan:     ***) ***       - ***       - ***       - ***       - ***    ***) ***       - ***       - ***       - ***       - ***    ***) ***       - ***       - ***       - ***       - ***      CODE STATUS: { Code Status:42629}  Access: {IV access:[id number]}  Antibiotics: ***  Diet: {Diet (All  options):70486}  DVT Prophylaxis: {DVT PPx:87612}  GI Prophylaxis: {meds:69311}  Fluids: {iv fluids:015843} *** ml/hr.     Disposition: ***        Kimber Peterson DO  [facility] Internal Medicine  HO-1

## 2023-03-16 NOTE — PLAN OF CARE
Problem: Adult Inpatient Plan of Care  Goal: Plan of Care Review  Outcome: Ongoing, Progressing  Goal: Patient-Specific Goal (Individualized)  Outcome: Ongoing, Progressing  Goal: Absence of Hospital-Acquired Illness or Injury  Outcome: Ongoing, Progressing  Goal: Optimal Comfort and Wellbeing  Outcome: Ongoing, Progressing  Goal: Readiness for Transition of Care  Outcome: Ongoing, Progressing     Problem: Fall Injury Risk  Goal: Absence of Fall and Fall-Related Injury  Outcome: Ongoing, Progressing     Problem: Adjustment to Illness (Heart Failure)  Goal: Optimal Coping  Outcome: Ongoing, Progressing     Problem: Cardiac Output Decreased (Heart Failure)  Goal: Optimal Cardiac Output  Outcome: Ongoing, Progressing     Problem: Dysrhythmia (Heart Failure)  Goal: Stable Heart Rate and Rhythm  Outcome: Ongoing, Progressing     Problem: Fluid Imbalance (Heart Failure)  Goal: Fluid Balance  Outcome: Ongoing, Progressing     Problem: Functional Ability Impaired (Heart Failure)  Goal: Optimal Functional Ability  Outcome: Ongoing, Progressing     Problem: Oral Intake Inadequate (Heart Failure)  Goal: Optimal Nutrition Intake  Outcome: Ongoing, Progressing     Problem: Respiratory Compromise (Heart Failure)  Goal: Effective Oxygenation and Ventilation  Outcome: Ongoing, Progressing     Problem: Sleep Disordered Breathing (Heart Failure)  Goal: Effective Breathing Pattern During Sleep  Outcome: Ongoing, Progressing

## 2023-03-16 NOTE — NURSING
Patient was given discharge teaching in regards to recent diagnosis and given medication education. Patient was instructed to keep scheduled appointments with the appointments already made and was told to call Dr. Richards for a follow up within this week. Patient had no further questions regarding discharge teaching. Patient's IV was removed. Patient received prescriptions at bedside and was given back her home medications. Patient was escorted down by hospital staff for discharge and was not in any distress.

## 2023-03-16 NOTE — PLAN OF CARE
03/16/23 1102   Medicare Message   Important Message from Medicare regarding Discharge Appeal Rights Given to patient/caregiver;Explained to patient/caregiver;Signed/date by patient/caregiver   Date IMM was signed 03/16/23   Time IMM was signed 1102

## 2023-03-16 NOTE — PLAN OF CARE
Problem: Adult Inpatient Plan of Care  Goal: Plan of Care Review  Outcome: Ongoing, Progressing  Flowsheets (Taken 3/16/2023 0745)  Plan of Care Reviewed With: patient  Goal: Optimal Comfort and Wellbeing  Outcome: Ongoing, Progressing  Intervention: Monitor Pain and Promote Comfort  Flowsheets (Taken 3/16/2023 0745)  Pain Management Interventions:   around-the-clock dosing utilized   quiet environment facilitated   relaxation techniques promoted  Intervention: Provide Person-Centered Care  Flowsheets (Taken 3/16/2023 0745)  Trust Relationship/Rapport:   questions answered   choices provided   care explained   questions encouraged   emotional support provided   reassurance provided   empathic listening provided   thoughts/feelings acknowledged     Problem: Fall Injury Risk  Goal: Absence of Fall and Fall-Related Injury  Outcome: Ongoing, Progressing  Intervention: Identify and Manage Contributors  Flowsheets (Taken 3/16/2023 0745)  Self-Care Promotion:   safe use of adaptive equipment encouraged   independence encouraged  Intervention: Promote Injury-Free Environment  Flowsheets (Taken 3/16/2023 0745)  Safety Promotion/Fall Prevention:   assistive device/personal item within reach   instructed to call staff for mobility

## 2023-03-16 NOTE — DISCHARGE SUMMARY
[facility] INTERNAL MEDICINE  INPATIENT DISCHARGE SUMMARY    Admitting Physician: Betty Reed MD  Attending Physician: Betty Reed MD  Date of Admit: 3/14/2023  Date of Discharge: 3/16/2023    Discharge to: Home or Self Care   Condition: Stable    Discharge Diagnoses     Patient Active Problem List   Diagnosis    Atrial fibrillation with RVR    CARUSO (dyspnea on exertion)    Cough       Consultants and Procedures     Consultants:  Consults (From admission, onward)          Status Ordering Provider     Inpatient consult to Social Work/Case Management  Once        Provider:  (Not yet assigned)    Ordered MANDA JAIN     Inpatient consult to Cardiology  Once        Provider:  Gwendolyn Navas MD    Completed RATNA VELASCO     Inpatient consult to Social Work/Case Management  Once        Provider:  (Not yet assigned)    Completed RATNA VELASCO     Inpatient consult to Internal Medicine  Once        Provider:  DO Bryan Butt ZUI KEAT             Procedures:   None    Brief History of Present Illness      Lynette Hernandez is a 77 y.o. female who has a PMH of hypertension, RBBB (present on EKG from 2004), and atrial fibrillation for 6 years (follows with Dr. Crystal). The patient presented to [facility] ED on 3/14/2023 with a primary complaint of SOB and chest heaviness for 1 week. Patient initially presented to Urgent Care but was sent to ED because of HR in 140s. Patient repots onset of chest heaviness and shortness of breath about 10 days ago on Saturday evening when she was awakened from sleep due to these symptoms. Symptoms did not resolve with deep breaths and took several minutes to resolve. Patient states she then noticed her breathing was better and would worsen during these episodes intermittently for the next week. She denies similar episodes in the past. States she has also noticed she becomes fatigued more easily with exertion and has a dry cough which all developed about 1 week ago. Denies fever, chills,  n/v, dysuria, diarrhea, constipation. Denies any recent changes to medication. Upon arrival to ED, vitals were as follows: T 97.9F, /109, , RR 18, SpO2 96% on RA. ED workup significant for negative troponin 0.021, mildly elevated d dimer, non-ischemic EKG, elevated BNP 1283, subtherapeutic digoxin level 0.4, and XR Chest concerning for pulmonary vascular congestion. Admitted to Internal Medicine for management of AF RVR and further workup for possible new onset CHF.    Hospital Course with Pertinent Findings     Admitted to inpatient unit for ongoing monitoring and medical therapy on 3/14/2023. Inpatient imaging included: CXR revealed findings consistent with pulmonary vascular congestion; Echocardiogram revealed EF 20% with severe left ventricular hypokinesis, severe left atrial enlargement and moderate right  atrial enlargement, moderate MR, moderate TR, mild MT, PASP 44, and small pericardial effusion. Hospital course: her heart rate improved gradually with medication titration and diuresis. Cardiology team was consulted and the following recommendations were provided: increase digoxin to 250mcg daily, transition to metoprolol succinate upon discharge, consider Entresto, continue xarelto, and recommend SINDI/DCCV on outpatient basis. She is overall stable to be discharged home at this time with outpatient follow-up with existing cardiologist.    Discharge physical exam:  Vitals:    03/16/23 0744   BP: 115/76   Pulse: 110   Resp: 20   Temp: 97.9 °F (36.6 °C)     General: Overweight w/o distress  HEENT: NC/AT; PERRL; nasal and oral mucosa moist and clear  Neck: Full ROM; no lymphadenopathy  Pulm: CTA bilaterally, normal work of breathing on room air  CV: Irregular heart beat w/ murmurs; no gallops; trace edema in BLE   GI: Soft with normal bowel sounds in all quadrants, no masses on palpation  MSK: Full ROM of all extremities and spine w/o limitation or discomfort  Derm: No rashes, abnormal bruising, or  skin lesions  Neuro: AAOx4; motor/sensory function intact  Psych: Cooperative; appropriate mood and affect      TIME SPENT ON DISCHARGE: 60 minutes    Discharge Medications        Medication List        START taking these medications      ENTRESTO 24-26 mg per tablet  Generic drug: sacubitriL-valsartan  Take 1 tablet by mouth 2 (two) times daily.            CHANGE how you take these medications      digoxin 250 mcg tablet  Commonly known as: LANOXIN  Take 1 tablet (0.25 mg total) by mouth once daily.  What changed:   medication strength  how much to take  when to take this     metoprolol succinate 25 MG 24 hr tablet  Commonly known as: TOPROL-XL  Take 1 tablet (25 mg total) by mouth 2 (two) times daily.  What changed: when to take this            CONTINUE taking these medications      XARELTO 20 mg Tab  Generic drug: rivaroxaban               Where to Get Your Medications        These medications were sent to [facility] [place] [address] [facility] [address]      Phone: [phone]   digoxin 250 mcg tablet  ENTRESTO 24-26 mg per tablet  metoprolol succinate 25 MG 24 hr tablet         Discharge Information:     -Post-elizondo F/U at [facility] IM clinic in 1 week with a repeat Digoxin level  -F/U with [facility] in 1 week  -Increased Digoxin to 250mcg daily; changed metoprolol succinate to 25mg BID, started Entresto  -Per [facility] Cardiology: recommend SIDNI/DCCV on outpatient basis and repeat TTE once in NSR or rate controlled in several months to assess for improvement in EF  -Discharging patient home with LifeVest until patient is seen by [facility]  -ED precautions provided     Kathie Ordaz DO  [facility] Internal Medicine PGY-II

## 2023-03-17 ENCOUNTER — PATIENT OUTREACH (OUTPATIENT)
Dept: ADMINISTRATIVE | Facility: CLINIC | Age: 78
End: 2023-03-17
Payer: MEDICARE

## 2023-03-17 ENCOUNTER — TELEPHONE (OUTPATIENT)
Dept: INTERNAL MEDICINE | Facility: CLINIC | Age: 78
End: 2023-03-17

## 2023-03-17 NOTE — PROGRESS NOTES
C3 nurse attempted to contact Lynette Hernandez  for a TCC post hospital discharge follow up call. No answer. Left voicemail with callback information. The patient has a scheduled HOSFU appointment with [facility] IM [facility] on 03/27/2023 @ 115 pm.

## 2023-03-17 NOTE — PROGRESS NOTES
C3 nurse attempted to contact Lynette Hernandez  for a TCC post hospital discharge follow up call. No answer. Left voicemail with callback information. The patient has a scheduled HOSFU appointment with [facility] IM [facility] on 03/27/2023 @ 115 pm.

## 2023-03-17 NOTE — PROGRESS NOTES
C3 nurse spoke with Arpita, patient's daughter-in-law for a TCC post hospital discharge follow up call. Stated patient at home being fitted with a Life vest and will give patient message regarding follow up call.

## 2023-03-19 LAB
AR ANA INTERPRETIVE COMMENT: NORMAL
AR ANTINUCLEAR ANTIBODY (ANA), HEP-2, IGG: NORMAL
AR CYTOPLASM PATTERN: ABNORMAL
AR CYTOPLASMIC TITER: ABNORMAL
SSA(RO) ANTIBODY (OHS): NEGATIVE
SSB(LA) ANTIBODY (OHS): NEGATIVE

## 2023-03-20 ENCOUNTER — TELEPHONE (OUTPATIENT)
Dept: INTERNAL MEDICINE | Facility: CLINIC | Age: 78
End: 2023-03-20

## 2023-03-20 LAB
RF IGA SER-ACNC: <5 UNITS
RF IGG SER-ACNC: 5 UNITS
RF IGM SER-ACNC: <5 UNITS

## 2023-03-20 NOTE — PROGRESS NOTES
Heart Failure Follow Up Call:  Upon discharge:      EF 20%      /76          Discharge Medications:  START taking these medications       ENTRESTO 24-26 mg per tablet  Generic drug: sacubitriL-valsartan  Take 1 tablet by mouth 2 (two) times daily.       CHANGE how you take these medications       digoxin 250 mcg tablet  Commonly known as: LANOXIN  Take 1 tablet (0.25 mg total) by mouth once daily.  What changed:   medication strength  how much to take  when to take this    metoprolol succinate 25 MG 24 hr tablet  Commonly known as: TOPROL-XL  Take 1 tablet (25 mg total) by mouth 2 (two) times daily.  What changed: when to take this       CONTINUE taking these medications       XARELTO 20 mg Tab  Generic drug: rivaroxaban     Discussed patients discharge medications.    Patient is taking all medications as prescribed except Xarelto. Patient is confused   About what new medication entresto was and what xarelto was being taken for.  Instructed patient in use of each and requested PCP to follow up with patient.   No side effects from medications were noted.    Patient stated feeling much better.  No current shortness of breath.   Discussed fluid pills and how to take if on an as needed prescription.  Discussed with patient the importance of taking and recording daily weights.  Discussed briefly how patient is doing with their heart healthy diet.  Patient attempting to get follow up appointment with Dr Richards.  Instructed if symptoms worsen to return to the emergency room.

## 2023-03-20 NOTE — PROGRESS NOTES
C3 nurse attempted to contact Lynette Hernandez  for a TCC post hospital discharge follow up call. No answer. Left voicemail with callback information. The patient has a scheduled HOSFU appointment with [facility] on 03/27/2023 @ 115 pm.

## 2023-03-20 NOTE — PROGRESS NOTES
C3 nurse attempted to contact Lynette Hernandez  for a TCC post hospital discharge follow up call. No answer. Left voicemail with callback information. The patient has a scheduled HOSFU appointment with [facility] IM [facility] on 03/27/2023 @ 115 pm.

## 2023-03-20 NOTE — TELEPHONE ENCOUNTER
Pt called stating Dr Amezcua wanted her to make an appt with Dr Richards but Dr Richards requesting a referral be sent over first before scheduling the pt. Please Advise.

## 2023-03-22 NOTE — PROGRESS NOTES
C3 nurse spoke with Lynette Hernandez  for a TCC post hospital discharge follow up call. The patient has a scheduled HOSFU appointment with [facility] on 03/27/2023 @ 115 pm.

## 2023-03-24 NOTE — PHYSICIAN QUERY
PT Name: Lynette Hernandez  MR #: [id number]     DOCUMENTATION CLARIFICATION     CDS/: Adamaris López RN             Contact information:[email or web address]  This form is a permanent document in the medical record.     Query Date: March 23, 2023    By submitting this query, we are merely seeking further clarification of documentation.  Please utilize your independent clinical judgment when addressing the question(s) below.    The Medical Record contains the following   Indicators Supporting Clinical Findings Location in Medical Record   x Heart Failure documented new CHF    Possible New onset CHF      HFrEF   RD note 3-15    H&P      Cardiology note 3-15   x BNP [id number]   Lab 3-14    EF/Echo     x Radiology findings XR Chest concerning for pulmonary vascular congestion Dc summary   x Subjective/Objective Respiratory Conditions SOB     Recent/Current MI      Heart Transplant, LVAD      Edema, JVD      Ascites     x Diuretics/Meds IV Furosemide Mar 3-14 to 3-15    Other Treatment      Other Patient is still overloaded on exam and would benefit from further IV diuresis.  Recommend giving Lasix 40 IV and monitoring response. Cardiology note 3-15     Heart failure is a clinical diagnosis which includes symptomatic fluid retention, elevated intracardiac pressures, and/or the inability of the heart to deliver adequate blood flow.    Heart Failure with reduced Ejection Fraction (HFrEF) or Systolic Heart Failure (loses ability to contract normally, EF is <40%)    Heart Failure with preserved Ejection Fraction (HFpEF) or Diastolic Heart Failure (stiff ventricles, does not relax properly, EF is >50%)     Heart Failure with Combined Systolic and Diastolic Failure (stiff ventricles, does not relax properly and EF is <50%)    Mid-range or mildly reduced ejection fraction (HFmrEF) is classified as systolic heart failure.  Congestive heart failure with a recovered EF is classified as Diastolic Heart  Failure.  Common clues to acute exacerbation:  Rapidly progressive symptoms (w/in 2 weeks of presentation), using IV diuretics, using supplemental O2, pulmonary edema on Xray, new or worsening pleural effusion, +JVD or other signs of volume overload, MI w/in 4 weeks, and/or BNP >500  The clinical guidelines noted are only system guidelines, and do not replace the providers clinical judgment.    Provider, please specify the acuity of the chf diagnosis associated with the above clinical findings.    [   x]  Acute Systolic Heart Failure (HFrEF or HFmrEF) - new diagnosis   [   ]  Other (please specify): ___________________________________   [  ]  Clinically Undetermined       Please document in your progress notes daily for the duration of treatment until resolved and include in your discharge summary.    References:  American Heart Association editorial staff. (2017, May). Ejection Fraction Heart Failure Measurement. American Heart Association. https://www.heart.org/en/health-topics/heart-failure/diagnosing-heart-failure/ejection-fraction-heart-failure-measurement#:~:text=Ejection%20fraction%20(EF)%20is%20a,pushed%20out%20with%20each%20heartbeat  MELO Beckman (2020, December 15). Heart failure with preserved ejection fraction: Clinical manifestations and diagnosis. [employer]ass[employer]Date. https://www.[employer].com/contents/heart-failure-with-preserved-ejection-fraction-clinical-manifestations-and-diagnosis.  ICD-10-CM/PCS Coding Clinic Third Quarter ICD-10, Effective with discharges: September 8, 2020 [facility] § Heart failure with mid-range or mildly reduced ejection fraction (2020).  ICD-10-CM/PCS Coding Clinic Third Quarter ICD-10, Effective with discharges: September 8, 2020 [facility] § Heart failure with recovered ejection fraction (2020).  Form No. 92648

## 2023-03-27 ENCOUNTER — HOSPITAL ENCOUNTER (OUTPATIENT)
Dept: RADIOLOGY | Facility: HOSPITAL | Age: 78
Discharge: HOME | End: 2023-03-27
Attending: STUDENT IN AN ORGANIZED HEALTH CARE EDUCATION/TRAINING PROGRAM
Payer: MEDICARE

## 2023-03-27 ENCOUNTER — TELEPHONE (OUTPATIENT)
Dept: INTERNAL MEDICINE | Facility: CLINIC | Age: 78
End: 2023-03-27

## 2023-03-27 ENCOUNTER — OFFICE VISIT (OUTPATIENT)
Dept: INTERNAL MEDICINE | Facility: CLINIC | Age: 78
End: 2023-03-27
Payer: MEDICARE

## 2023-03-27 VITALS
SYSTOLIC BLOOD PRESSURE: 120 MMHG | RESPIRATION RATE: 20 BRPM | BODY MASS INDEX: 28.42 KG/M2 | HEART RATE: 131 BPM | OXYGEN SATURATION: 93 % | DIASTOLIC BLOOD PRESSURE: 73 MMHG | WEIGHT: 155.38 LBS | TEMPERATURE: 98 F

## 2023-03-27 DIAGNOSIS — R06.09 DOE (DYSPNEA ON EXERTION): ICD-10-CM

## 2023-03-27 DIAGNOSIS — I48.91 ATRIAL FIBRILLATION WITH RVR: Primary | ICD-10-CM

## 2023-03-27 DIAGNOSIS — I50.20 HFREF (HEART FAILURE WITH REDUCED EJECTION FRACTION): ICD-10-CM

## 2023-03-27 DIAGNOSIS — I48.91 ATRIAL FIBRILLATION WITH RAPID VENTRICULAR RESPONSE: ICD-10-CM

## 2023-03-27 PROCEDURE — 71046 X-RAY EXAM CHEST 2 VIEWS: CPT | Mod: TC

## 2023-03-27 PROCEDURE — 99214 OFFICE O/P EST MOD 30 MIN: CPT | Mod: PBBFAC,25 | Performed by: STUDENT IN AN ORGANIZED HEALTH CARE EDUCATION/TRAINING PROGRAM

## 2023-03-27 RX ORDER — FUROSEMIDE 20 MG/1
20 TABLET ORAL DAILY PRN
Qty: 30 TABLET | Refills: 1 | Status: SHIPPED | OUTPATIENT
Start: 2023-03-27 | End: 2024-03-26

## 2023-03-27 NOTE — TELEPHONE ENCOUNTER
"PT daughter -in -law  called concerning "she needs cardioversion and nobody will return her calls"  DIL states ptis wearing a life vest from prior to admission and she is concerned that she has not been seen . Informed that pt was to call Existing Cardiologist. After discharge. Pt has an appointment this afternoon for Post elizondo clinic with Dr David Ureña . Her PCP is Dr Reinoso. Advised that patient keep this appointment . Dr. Reinoso has 2 phone calls regarding this pt . Please advise what can be done to facilitate her care   "

## 2023-03-27 NOTE — PROGRESS NOTES
INTERNAL MEDICINE RESIDENT CLINIC  CLINIC NOTE    Patient Name: Lynette Hernandez  YOB: 1945  Chief Complaint: Follow-up (Post elizondo )     PRESENTING HISTORY   History of Present Illness:  Ms. Lynette Hernandez is a 77 y.o. female w/ PMH of hypertension, RBBB (present on EKG from 2004), and atrial fibrillation for 6 years (follows with Dr. Crystal). Patient here for post-wards follow up; discharged on 3/16/2023.       Patient states that she is feeling slightly better but still having some CARUSO. Denies swelling in LE. She does have on LifeVest but denies shocks. She has not seen her cardiologist since discharge and was  not able to get in touch with him today. Denies issues with her medications.     Review of Systems:  12 point review of symptoms negative unless otherwise stated above    PAST HISTORY:     Past Medical History:   Diagnosis Date    Anticoagulant long-term use     Atrial fibrillation     Hypertension         No past surgical history on file.    Family History   Problem Relation Age of Onset    Lung cancer Mother     Heart disease Father     Heart disease Brother        Social History     Socioeconomic History    Marital status:    Tobacco Use    Smoking status: Never     Passive exposure: Past    Smokeless tobacco: Never   Substance and Sexual Activity    Alcohol use: Yes     Alcohol/week: 1.0 standard drink     Types: 1 Glasses of wine per week    Drug use: Never    Sexual activity: Not Currently     Social Determinants of Health     Food Insecurity: Unknown    Worried About Running Out of Food in the Last Year: Never true   Transportation Needs: No Transportation Needs    Lack of Transportation (Medical): No    Lack of Transportation (Non-Medical): No   Physical Activity: Inactive    Days of Exercise per Week: 0 days    Minutes of Exercise per Session: 0 min   Social Connections: Moderately Integrated    Frequency of Communication with Friends and Family: More than three times a week     Frequency of Social Gatherings with Friends and Family: Once a week    Attends [employer] Services: More than 4 times per year    Active Member of Clubs or Organizations: Yes    Attends Club or Organization Meetings: More than 4 times per year    Marital Status:    Housing Stability: Unknown    Number of Places Lived in the Last Year: 1    Unstable Housing in the Last Year: No       MEDICATIONS:     Current Outpatient Medications   Medication Instructions    digoxin (LANOXIN) 0.25 mg, Oral, Daily    metoprolol succinate (TOPROL-XL) 25 mg, Oral, 2 times daily    sacubitriL-valsartan (ENTRESTO) 24-26 mg per tablet 1 tablet, Oral, 2 times daily    XARELTO 20 mg Tab 1 tablet, Oral, Daily        OBJECTIVE:   Vital Signs:  Vitals:    03/27/23 1342   BP: 120/73   Pulse: (!) 131   Resp: 20   Temp: 98.1 °F (36.7 °C)   TempSrc: Oral   SpO2: (!) 93%   Weight: 70.5 kg (155 lb 6.4 oz)        Physical Exam:  General: Awake, alert, & oriented to person, place & time. No acute distress  Psychiatric: Mood and affect normal  HEENT: Normocephalic, atraumatic. Face symmetric.  Cardiovascular: Normal S1 & S2 w/out murmurs, rubs or gallops, regularly irregular rhythm; tachycardic  Pulmonary: Bilateral symmetric chest rise, Non-labored  Abdominal:  Soft nondistended  Extremities: No clubbing, cyanosis or edema.  Skin:  Exposed skin is warm & dry.  Neuro:   Patient moves all extremities equally. Sensation intact bilateraly.      Laboratory  Lab Results   Component Value Date     03/16/2023    K 3.7 03/16/2023    CO2 26 03/16/2023    BUN 14.5 03/16/2023    CREATININE 0.85 03/16/2023    CALCIUM 9.7 03/16/2023    ALKPHOS 77 03/16/2023    AST 24 03/16/2023    ALT 25 03/16/2023    MG 2.00 03/16/2023    PHOS 3.3 03/16/2023        Lab Results   Component Value Date    WBC 5.4 03/16/2023    RBC 3.80 (L) 03/16/2023    HGB 13.1 03/16/2023    HCT 39.2 03/16/2023    .2 (H) 03/16/2023    MCH 34.5 03/16/2023    [facility]  [facility]20[facility]    RDW 13.4 03/16/2023     03/16/2023    MPV 11.3 (H) 03/16/2023        Diagnostic Results:  X-Ray Chest AP Portable    Result Date: 3/14/2023  EXAMINATION:  XR CHEST AP PORTABLE    CLINICAL HISTORY:  Chest Pain;    COMPARISON:  27 February 2018    FINDINGS:  Portable frontal view of the chest was obtained. Cardiac silhouette is enlarged.  There are bilateral interstitial predominant opacities.  Suspect small pleural effusions.  No pneumothorax.        Echo    Result Date: 3/15/2023  · The left ventricle is normal in size with concentric remodeling and   severely decreased systolic function.  · The estimated ejection fraction is 20%.  · There is severe left ventricular global hypokinesis.  · Diastolic function is difficult to evaluate due to atrial fibrillation   but appears abnormal.  · Mild right ventricular enlargement with mildly reduced right ventricular   systolic function.  · There is abnormal septal wall motion. There is diastolic flattening of   the interventricular septum consistent with right ventricle volume   overload.  · Severe left atrial enlargement.  · Mild right atrial enlargement.  · Moderate mitral regurgitation.  · Mild to moderate tricuspid regurgitation.  · Mild aortic regurgitation.  · Mild pulmonic regurgitation.  · The estimated PA systolic pressure is 44 mmHg.  · There is mild pulmonary hypertension.  · Intermediate central venous pressure (8 mmHg).  · Small circumferential pericardial effusion.          No results found in the last 24 hours.     ASSESSMENT & PLAN:     Atrial fibrillation  HFrEF (EF of 20%)  HTN  RBBB  -Continue Life Vest  -advised patient to make appt with cardiologist for further management of afib and heart failure  -continue digoxin, metoprolol, xarelto, and entresto  -ordered CXR to assess fluid status as pt is having some SOB  -will give PRN lasix 20 until can see cardiologist.     Follow up with PCP.      Aki Amezcua MD  PGY-3,  Internal Medicine

## 2023-04-05 ENCOUNTER — OFFICE VISIT (OUTPATIENT)
Dept: CARDIOLOGY | Facility: CLINIC | Age: 78
End: 2023-04-05
Payer: MEDICARE

## 2023-04-05 VITALS
WEIGHT: 152 LBS | DIASTOLIC BLOOD PRESSURE: 70 MMHG | HEIGHT: 62 IN | RESPIRATION RATE: 18 BRPM | OXYGEN SATURATION: 99 % | TEMPERATURE: 99 F | HEART RATE: 84 BPM | BODY MASS INDEX: 27.97 KG/M2 | SYSTOLIC BLOOD PRESSURE: 128 MMHG

## 2023-04-05 DIAGNOSIS — I50.20 HFREF (HEART FAILURE WITH REDUCED EJECTION FRACTION): ICD-10-CM

## 2023-04-05 DIAGNOSIS — I48.91 ATRIAL FIBRILLATION, UNSPECIFIED TYPE: Primary | ICD-10-CM

## 2023-04-05 DIAGNOSIS — I43 TACHYCARDIA INDUCED CARDIOMYOPATHY: ICD-10-CM

## 2023-04-05 DIAGNOSIS — I48.91 ATRIAL FIBRILLATION WITH RVR: ICD-10-CM

## 2023-04-05 DIAGNOSIS — R00.0 TACHYCARDIA INDUCED CARDIOMYOPATHY: ICD-10-CM

## 2023-04-05 PROCEDURE — 93005 ELECTROCARDIOGRAM TRACING: CPT

## 2023-04-05 PROCEDURE — 99215 OFFICE O/P EST HI 40 MIN: CPT | Mod: PBBFAC | Performed by: INTERNAL MEDICINE

## 2023-04-05 NOTE — PROGRESS NOTES
CHIEF COMPLAINT:   Chief Complaint   Patient presents with    Follow-up     Hospital f/u  palpitations only in the morning.                  HPI:  Lynette Hernandez 77 y.o. female 77-year-old with history of atrial fibrillation, a usual patient of Dr. Crystal who was recently admitted to [facility] with ongoing shortness of breath, lower extremity edema and fatigue since December 2022.  She was found to be in AFib with RVR on presentation and was found to have reduced ejection fraction on echo. She was treated with diuresis and rate controlling agents. Plan was to perform SINDI/DCCV however due to unavailability of SINDI probe, procedure was deferred.  She was started on entreto 24/26mg bid and was discharged on toprol 25mg bid and dig 0.25mg.     Today, she reports she is significantly better but continues to have shortness of breath in the morning as well as fatigue and brain fog. Lasts about an hour. Reports energy level gets better throughout the day. She denies any palpitations. She has mild swelling in the lower extremities and is only taking lasix 20mg PO PRN, and has only needed to take it twice. No orthopnea, no PND. No events or alarms from lifevest.                                                                                                                                                                                                                                                                                                                                                                                                                                                                                      CARDIAC TESTING:  Results for orders placed during the hospital encounter of 03/14/23    Echo    Interpretation Summary  · The left ventricle is normal in size with concentric remodeling and severely decreased systolic function.  · The estimated ejection fraction is 20%.  · There is severe left ventricular  global hypokinesis.  · Diastolic function is difficult to evaluate due to atrial fibrillation but appears abnormal.  · Mild right ventricular enlargement with mildly reduced right ventricular systolic function.  · There is abnormal septal wall motion. There is diastolic flattening of the interventricular septum consistent with right ventricle volume overload.  · Severe left atrial enlargement.  · Mild right atrial enlargement.  · Moderate mitral regurgitation.  · Mild to moderate tricuspid regurgitation.  · Mild aortic regurgitation.  · Mild pulmonic regurgitation.  · The estimated PA systolic pressure is 44 mmHg.  · There is mild pulmonary hypertension.  · Intermediate central venous pressure (8 mmHg).  · Small circumferential pericardial effusion.           Patient Active Problem List   Diagnosis    Atrial fibrillation with RVR    CARUSO (dyspnea on exertion)    Cough     History reviewed. No pertinent surgical history.  Social History     Socioeconomic History    Marital status:    Tobacco Use    Smoking status: Never     Passive exposure: Past    Smokeless tobacco: Never   Substance and Sexual Activity    Alcohol use: Yes     Alcohol/week: 1.0 standard drink     Types: 1 Glasses of wine per week    Drug use: Never    Sexual activity: Not Currently     Social Determinants of Health     Food Insecurity: Unknown    Worried About Running Out of Food in the Last Year: Never true   Transportation Needs: No Transportation Needs    Lack of Transportation (Medical): No    Lack of Transportation (Non-Medical): No   Physical Activity: Inactive    Days of Exercise per Week: 0 days    Minutes of Exercise per Session: 0 min   Social Connections: Moderately Integrated    Frequency of Communication with Friends and Family: More than three times a week    Frequency of Social Gatherings with Friends and Family: Once a week    Attends Gnosticist Services: More than 4 times per year    Active Member of Clubs or Organizations:  "Yes    Attends Club or Organization Meetings: More than 4 times per year    Marital Status:    Housing Stability: Unknown    Number of Places Lived in the Last Year: 1    Unstable Housing in the Last Year: No        Family History   Problem Relation Age of Onset    Lung cancer Mother     Heart disease Father     Heart disease Brother      Review of patient's allergies indicates:  No Known Allergies      ROS:                                                                                                                                                                             Negative except as stated in the history of present illness. See HPI for details.    PHYSICAL EXAM:  Visit Vitals  /70 (BP Location: Left arm, Patient Position: Sitting, BP Method: Medium (Automatic))   Pulse 84   Temp 98.6 °F (37 °C)   Resp 18   Ht 5' 2" (1.575 m)   Wt 68.9 kg (152 lb)   SpO2 99%   BMI 27.80 kg/m²       General: alert and oriented/no acute distress  Eye: EOMI/normal conjunctiva/no xanthelasma  HENT: normocephalic/moist oral mucosa  Neck: supple/nontender/no carotid bruit  Respiratory: lungs CTA/nonlabored respirations/BS equal/symmetrical expansion/no  chest wall tenderness  Cardiovascular: irregularly irregular/no murmur/normal peripheral perfusion/no  edema/no JVD  Gastrointestinal: soft/nontender  Musculoskeletal: normal ROM  Integumentary: warm/dry/pink/intact  Neurologic: alert/oriented/normal sensory/no focal deficits  Psychiatric: cooperative/appropriate mood and affect/normal judgment    Current Outpatient Medications   Medication Instructions    digoxin (LANOXIN) 0.25 mg, Oral, Daily    furosemide (LASIX) 20 mg, Oral, Daily PRN    metoprolol succinate (TOPROL-XL) 25 mg, Oral, 2 times daily    sacubitriL-valsartan (ENTRESTO) 24-26 mg per tablet 1 tablet, Oral, 2 times daily    XARELTO 20 mg Tab 1 tablet, Oral, Daily        All medications, laboratory studies, cardiac diagnostic imaging reviewed.     Lab " Results   Component Value Date    CREATININE 0.85 03/16/2023    MG 2.00 03/16/2023    K 3.7 03/16/2023        ASSESSMENT/PLAN:    1. Atrial fibrillation  HFrEF- likely tachycardia induced cardiomyopathy  Pt with recent admission for HF symptoms and   Pt reports she say Dr. Crystal since discharge and he is planning on possible cardioversion in the near future and also ordered a nuclear stress test  Pt would like to follow up with Dr. Crystal  Pt rate controlled today on toprol 25mg bid and dig 0.25mg  Continue xarelto 20mg  Continue entresto 24/26mg bid    Daytime fatigue  Will need to rule out sleep apnea especially with hx of afib    Follow up as needed

## 2023-04-20 PROBLEM — R00.0 TACHYCARDIA INDUCED CARDIOMYOPATHY: Status: ACTIVE | Noted: 2023-04-20

## 2023-04-20 PROBLEM — I50.20 HFREF (HEART FAILURE WITH REDUCED EJECTION FRACTION): Status: ACTIVE | Noted: 2023-04-20

## 2023-04-20 PROBLEM — I43 TACHYCARDIA INDUCED CARDIOMYOPATHY: Status: ACTIVE | Noted: 2023-04-20

## 2023-05-03 NOTE — PROGRESS NOTES
Chief Complaint  Follow-up (Pt here today for 3 mth f/u visit. Presently wearing a Life Vest. States she doing well. No distress noted at this time. )     GAMAL Hernandez is a 77 y.o. female who has a past medical history of Anticoagulant long-term use, Atrial fibrillation, and Hypertension.  She presents to clinic today fo follow up of chronic medical consitions. She was in the hospital during the month of mach for CARUSO d/t new onset CHF. She was eventually discharged on 3/16 with a lifevest. She has had follow up with cardiology Dr. Silverio and he is scheduling her for a stress test in the near future. She continues to wear her life vest and has no complaints today. She is taking her medications as prescribed.     ROS  Review of Systems   Constitutional:  Positive for weight loss. Negative for chills and fever.   Eyes:  Negative for blurred vision and double vision.   Respiratory:  Negative for cough and shortness of breath.    Cardiovascular:  Negative for chest pain, palpitations and leg swelling.   Gastrointestinal:  Negative for abdominal pain, constipation and diarrhea.   Genitourinary:  Negative for dysuria.   Musculoskeletal:  Negative for myalgias.   Neurological:  Negative for dizziness and headaches.       PE  Vitals:    05/04/23 1437   BP: 103/68   Pulse: 74   Resp: 20   Temp: 98 °F (36.7 °C)        Physical Exam  Vitals and nursing note reviewed.   Constitutional:       General: She is not in acute distress.     Appearance: Normal appearance. She is normal weight. She is not ill-appearing or toxic-appearing.   Eyes:      Extraocular Movements: Extraocular movements intact.      Conjunctiva/sclera: Conjunctivae normal.      Pupils: Pupils are equal, round, and reactive to light.   Cardiovascular:      Rate and Rhythm: Normal rate. Rhythm irregular.      Pulses: Normal pulses.      Heart sounds: Normal heart sounds. No murmur heard.    No gallop.      Comments: Wearing life vest    Pulmonary:       Effort: Pulmonary effort is normal. No respiratory distress.      Breath sounds: Normal breath sounds. No stridor. No wheezing, rhonchi or rales.   Abdominal:      General: Abdomen is flat. Bowel sounds are normal.      Palpations: Abdomen is soft.   Musculoskeletal:      Cervical back: Normal range of motion.   Skin:     General: Skin is warm and dry.   Neurological:      General: No focal deficit present.      Mental Status: She is alert and oriented to person, place, and time.      Motor: No weakness.      Gait: Gait normal.        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 Silverio for Management    Hypercalcemia  Hyperparathyroidism  Recheck BMP, Urine cr, Urine calcium, vitamin D, PTH    RTC in 3 months.  Does not want vaccines    No future appointments.      Fawad Reinoso, DO  Internal Medicine - PGY-2

## 2023-05-04 ENCOUNTER — OFFICE VISIT (OUTPATIENT)
Dept: INTERNAL MEDICINE | Facility: CLINIC | Age: 78
End: 2023-05-04
Payer: MEDICARE

## 2023-05-04 VITALS
OXYGEN SATURATION: 96 % | BODY MASS INDEX: 27.86 KG/M2 | HEIGHT: 62 IN | RESPIRATION RATE: 20 BRPM | TEMPERATURE: 98 F | WEIGHT: 151.38 LBS | HEART RATE: 74 BPM | SYSTOLIC BLOOD PRESSURE: 103 MMHG | DIASTOLIC BLOOD PRESSURE: 68 MMHG

## 2023-05-04 DIAGNOSIS — I50.20 HFREF (HEART FAILURE WITH REDUCED EJECTION FRACTION): ICD-10-CM

## 2023-05-04 DIAGNOSIS — I48.91 ATRIAL FIBRILLATION WITH RAPID VENTRICULAR RESPONSE: Primary | ICD-10-CM

## 2023-05-04 DIAGNOSIS — E83.52 HYPERCALCEMIA: ICD-10-CM

## 2023-05-04 DIAGNOSIS — E21.3 HYPERPARATHYROIDISM: ICD-10-CM

## 2023-05-04 PROCEDURE — 99214 OFFICE O/P EST MOD 30 MIN: CPT | Mod: PBBFAC

## 2023-05-22 RX ORDER — RIVAROXABAN 20 MG/1
20 TABLET, FILM COATED ORAL DAILY
Qty: 30 TABLET | Refills: 3 | Status: SHIPPED | OUTPATIENT
Start: 2023-05-22 | End: 2023-07-03 | Stop reason: SDUPTHER

## 2023-05-22 NOTE — TELEPHONE ENCOUNTER
----- Message from Joann Garcia sent at 5/22/2023  9:35 AM CDT -----  Regarding: Dr Reinoso RX RQ  Provider: Dr Reinoso      Last Visit: 5/4/2023      Next Visit: not scheduled yet       Patient's Contact #: [phone]      Medication Name & Dose: Xarelto 20mg                                             Entresto 24-26mg       Preferred Pharmacy: [facility]      Comment:

## 2023-07-03 RX ORDER — RIVAROXABAN 20 MG/1
20 TABLET, FILM COATED ORAL DAILY
Qty: 30 TABLET | Refills: 3 | Status: SHIPPED | OUTPATIENT
Start: 2023-07-03 | End: 2023-08-03 | Stop reason: SDUPTHER

## 2023-07-03 RX ORDER — METOPROLOL SUCCINATE 25 MG/1
25 TABLET, EXTENDED RELEASE ORAL 2 TIMES DAILY
Qty: 60 TABLET | Refills: 2 | Status: SHIPPED | OUTPATIENT
Start: 2023-07-03 | End: 2023-09-15 | Stop reason: SDUPTHER

## 2023-07-03 RX ORDER — DIGOXIN 250 MCG
0.25 TABLET ORAL DAILY
Qty: 30 TABLET | Refills: 11 | Status: SHIPPED | OUTPATIENT
Start: 2023-07-03 | End: 2023-08-03 | Stop reason: SDUPTHER

## 2023-08-04 RX ORDER — DIGOXIN 250 MCG
0.25 TABLET ORAL DAILY
Qty: 30 TABLET | Refills: 11 | Status: SHIPPED | OUTPATIENT
Start: 2023-08-04 | End: 2023-09-15 | Stop reason: SDUPTHER

## 2023-08-04 RX ORDER — RIVAROXABAN 20 MG/1
20 TABLET, FILM COATED ORAL DAILY
Qty: 30 TABLET | Refills: 3 | Status: SHIPPED | OUTPATIENT
Start: 2023-08-04 | End: 2023-09-15 | Stop reason: SDUPTHER

## 2023-08-24 ENCOUNTER — OFFICE VISIT (OUTPATIENT)
Dept: INTERNAL MEDICINE | Facility: CLINIC | Age: 78
End: 2023-08-24
Payer: MEDICARE

## 2023-08-24 VITALS
DIASTOLIC BLOOD PRESSURE: 75 MMHG | HEIGHT: 62 IN | BODY MASS INDEX: 26.35 KG/M2 | WEIGHT: 143.19 LBS | OXYGEN SATURATION: 98 % | RESPIRATION RATE: 19 BRPM | TEMPERATURE: 98 F | SYSTOLIC BLOOD PRESSURE: 127 MMHG | HEART RATE: 72 BPM

## 2023-08-24 DIAGNOSIS — M54.50 CHRONIC MIDLINE LOW BACK PAIN WITHOUT SCIATICA: ICD-10-CM

## 2023-08-24 DIAGNOSIS — I50.20 HFREF (HEART FAILURE WITH REDUCED EJECTION FRACTION): Primary | ICD-10-CM

## 2023-08-24 DIAGNOSIS — G89.29 CHRONIC MIDLINE LOW BACK PAIN WITHOUT SCIATICA: ICD-10-CM

## 2023-08-24 DIAGNOSIS — I48.91 ATRIAL FIBRILLATION, UNSPECIFIED TYPE: ICD-10-CM

## 2023-08-24 PROCEDURE — 99213 OFFICE O/P EST LOW 20 MIN: CPT | Mod: PBBFAC | Performed by: INTERNAL MEDICINE

## 2023-08-24 RX ORDER — SACUBITRIL AND VALSARTAN 24; 26 MG/1; MG/1
1 TABLET, FILM COATED ORAL 2 TIMES DAILY
COMMUNITY
Start: 2023-08-04

## 2023-08-24 NOTE — PROGRESS NOTES
hospitals Internal Medicine clinic     Chief Complaint  Follow-up (Pt here today for f/u visit. No c/o SOB / CARUSO noted at this time. C/o lower back pain difficulty to go from sitting to standing. )     HPI  Lynette Hernandez is a 77 y.o. female who has a past medical history of CHF (HFrEF 20%) and Atrial fibrillation.     Since our last visit she has no acute complaints. However, she does tell me that she was put on a life vest by her cardiologist but stopped wearing after 2 months. Unsure why this  happened and also cardiologist does not plan on doing a Cardioversion.     ROS  Review of Systems   Constitutional:  Negative for chills, fever and weight loss.   Eyes:  Negative for blurred vision and double vision.   Respiratory:  Negative for cough and shortness of breath.    Cardiovascular:  Negative for chest pain, palpitations and leg swelling.   Gastrointestinal:  Negative for abdominal pain, constipation and diarrhea.   Genitourinary:  Negative for dysuria.   Musculoskeletal:  Negative for myalgias.   Neurological:  Negative for dizziness and headaches.         PE  Vitals:    08/24/23 1013   BP: 127/75   Pulse: 72   Resp: 19   Temp: 97.7 °F (36.5 °C)        Physical Exam  Vitals and nursing note reviewed.   Constitutional:       General: She is not in acute distress.     Appearance: Normal appearance. She is normal weight. She is not ill-appearing or toxic-appearing.   Eyes:      Extraocular Movements: Extraocular movements intact.      Conjunctiva/sclera: Conjunctivae normal.      Pupils: Pupils are equal, round, and reactive to light.   Cardiovascular:      Rate and Rhythm: Normal rate. Rhythm irregular.      Pulses: Normal pulses.      Heart sounds: Normal heart sounds. No murmur heard.     No gallop.      Comments:     Pulmonary:      Effort: Pulmonary effort is normal. No respiratory distress.      Breath sounds: Normal breath sounds. No stridor. No wheezing, rhonchi or rales.   Abdominal:      General: Abdomen is  flat. Bowel sounds are normal.      Palpations: Abdomen is soft.   Musculoskeletal:      Cervical back: Normal range of motion.      Comments: Lumbar spine non tender   Skin:     General: Skin is warm and dry.   Neurological:      General: No focal deficit present.      Mental Status: She is alert and oriented to person, place, and time.      Motor: No weakness.      Gait: Gait normal.          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 Silverio for Management  Had Stress test at [facility]. No [facility]  Would be happy to refer to new cardiologist    Osteopenia  Continue Vitamin D  Had DEXA    Chronic low back pain  Improved with self guided exercise  Xray lumbar spine    RTC in 3 months.  Does not want vaccines    No future appointments.      Fawad Reinoso, DO  Internal Medicine - PGY-3

## 2023-08-28 ENCOUNTER — TELEPHONE (OUTPATIENT)
Dept: ADMINISTRATIVE | Facility: HOSPITAL | Age: 78
End: 2023-08-28
Payer: MEDICARE

## 2023-08-28 NOTE — TELEPHONE ENCOUNTER
"ART,     MS. NYE CALLED WANTING TO KNOW WHAT IT IS THAT SHE "NEED TO  SOMETHING FROM RADIOLOGY".  SHE STATED THAT YOU WOULD KNOW WHAT THAT WOULD BE.    "

## 2023-08-31 ENCOUNTER — HOSPITAL ENCOUNTER (OUTPATIENT)
Dept: RADIOLOGY | Facility: HOSPITAL | Age: 78
Discharge: HOME | End: 2023-08-31
Attending: INTERNAL MEDICINE
Payer: MEDICARE

## 2023-08-31 DIAGNOSIS — G89.29 CHRONIC MIDLINE LOW BACK PAIN WITHOUT SCIATICA: ICD-10-CM

## 2023-08-31 DIAGNOSIS — M54.50 CHRONIC MIDLINE LOW BACK PAIN WITHOUT SCIATICA: ICD-10-CM

## 2023-08-31 PROCEDURE — 72110 X-RAY EXAM L-2 SPINE 4/>VWS: CPT | Mod: TC

## 2023-09-18 RX ORDER — RIVAROXABAN 20 MG/1
20 TABLET, FILM COATED ORAL DAILY
Qty: 30 TABLET | Refills: 3 | Status: SHIPPED | OUTPATIENT
Start: 2023-09-18

## 2023-09-18 RX ORDER — DIGOXIN 250 MCG
0.25 TABLET ORAL DAILY
Qty: 30 TABLET | Refills: 11 | Status: SHIPPED | OUTPATIENT
Start: 2023-09-18 | End: 2024-09-17

## 2023-09-18 RX ORDER — METOPROLOL SUCCINATE 25 MG/1
25 TABLET, EXTENDED RELEASE ORAL 2 TIMES DAILY
Qty: 60 TABLET | Refills: 2 | Status: SHIPPED | OUTPATIENT
Start: 2023-09-18 | End: 2023-12-17

## 2024-07-12 ENCOUNTER — TELEPHONE (OUTPATIENT)
Dept: INTERNAL MEDICINE | Facility: CLINIC | Age: 79
End: 2024-07-12
Payer: MEDICARE

## 2025-01-16 ENCOUNTER — OFFICE VISIT (OUTPATIENT)
Dept: PRIMARY CARE CLINIC | Facility: CLINIC | Age: 80
End: 2025-01-16
Payer: MEDICARE

## 2025-01-16 VITALS
WEIGHT: 135 LBS | OXYGEN SATURATION: 96 % | RESPIRATION RATE: 15 BRPM | SYSTOLIC BLOOD PRESSURE: 136 MMHG | HEART RATE: 103 BPM | TEMPERATURE: 98 F | BODY MASS INDEX: 24.84 KG/M2 | HEIGHT: 62 IN | DIASTOLIC BLOOD PRESSURE: 76 MMHG

## 2025-01-16 DIAGNOSIS — G45.9 TRANSIENT CEREBRAL ISCHEMIA, UNSPECIFIED TYPE: Primary | ICD-10-CM

## 2025-01-16 DIAGNOSIS — R79.9 ABNORMAL BLOOD CHEMISTRY: ICD-10-CM

## 2025-01-16 DIAGNOSIS — D64.9 MILD ANEMIA: ICD-10-CM

## 2025-01-16 DIAGNOSIS — I50.20 HFREF (HEART FAILURE WITH REDUCED EJECTION FRACTION): ICD-10-CM

## 2025-01-16 PROBLEM — R05.9 COUGH: Status: RESOLVED | Noted: 2023-03-14 | Resolved: 2025-01-16

## 2025-01-16 PROCEDURE — 3288F FALL RISK ASSESSMENT DOCD: CPT | Mod: CPTII,,, | Performed by: STUDENT IN AN ORGANIZED HEALTH CARE EDUCATION/TRAINING PROGRAM

## 2025-01-16 PROCEDURE — 1101F PT FALLS ASSESS-DOCD LE1/YR: CPT | Mod: CPTII,,, | Performed by: STUDENT IN AN ORGANIZED HEALTH CARE EDUCATION/TRAINING PROGRAM

## 2025-01-16 PROCEDURE — 3075F SYST BP GE 130 - 139MM HG: CPT | Mod: CPTII,,, | Performed by: STUDENT IN AN ORGANIZED HEALTH CARE EDUCATION/TRAINING PROGRAM

## 2025-01-16 PROCEDURE — 1159F MED LIST DOCD IN RCRD: CPT | Mod: CPTII,,, | Performed by: STUDENT IN AN ORGANIZED HEALTH CARE EDUCATION/TRAINING PROGRAM

## 2025-01-16 PROCEDURE — 3078F DIAST BP <80 MM HG: CPT | Mod: CPTII,,, | Performed by: STUDENT IN AN ORGANIZED HEALTH CARE EDUCATION/TRAINING PROGRAM

## 2025-01-16 PROCEDURE — 99214 OFFICE O/P EST MOD 30 MIN: CPT | Mod: ,,, | Performed by: STUDENT IN AN ORGANIZED HEALTH CARE EDUCATION/TRAINING PROGRAM

## 2025-01-16 PROCEDURE — 1160F RVW MEDS BY RX/DR IN RCRD: CPT | Mod: CPTII,,, | Performed by: STUDENT IN AN ORGANIZED HEALTH CARE EDUCATION/TRAINING PROGRAM

## 2025-01-16 NOTE — PROGRESS NOTES
Subjective:       Patient ID: Lynette Hernandez is a 79 y.o. female.    -------------------------------------    Anticoagulant long-term use    Atrial fibrillation    Hypertension        Chief Complaint: Establish Care    Presents to establish care.     C/o episode of diffuse pain about 3 weeks ago. Woke up in diffuse pain. Improved over a few hours but did leave her with residual RLE and RUE pain. Pain is achy but at times sharp pains. Since then has had recurrent episodes of this pain.   She reported these symptoms to her Cardiologist who obtained x-rays of the cervical spine.   Today, reports gait is altered but feels it's sec to the pain.       Review of Systems   Constitutional:  Negative for chills and fever.   HENT:  Negative for sinus pressure/congestion and sore throat.    Respiratory:  Negative for cough, shortness of breath and wheezing.    Cardiovascular:  Negative for chest pain and leg swelling.   Gastrointestinal:  Negative for abdominal pain, nausea and vomiting.   Genitourinary:  Negative for dysuria and hematuria.   Musculoskeletal:  Positive for gait problem and leg pain.   Integumentary:  Negative for rash.   Neurological:  Negative for dizziness and syncope.   Hematological:  Negative for adenopathy.   Psychiatric/Behavioral:  Negative for confusion. The patient is not nervous/anxious.            Objective:      Physical Exam  Vitals and nursing note reviewed.   Constitutional:       General: She is not in acute distress.  HENT:      Head: Normocephalic.      Nose: No rhinorrhea.   Eyes:      Conjunctiva/sclera: Conjunctivae normal.      Pupils: Pupils are equal, round, and reactive to light.   Cardiovascular:      Rate and Rhythm: Normal rate and regular rhythm.   Pulmonary:      Effort: Pulmonary effort is normal.      Breath sounds: Normal breath sounds.   Abdominal:      Palpations: Abdomen is soft.      Tenderness: There is no abdominal tenderness.   Musculoskeletal:         General: No  deformity or signs of injury.   Skin:     General: Skin is warm and dry.   Neurological:      Mental Status: She is alert.      Comments: Unremarkable cranial nerve exam.  Gait is abnormal - unsteady, slow, wide based  Questionable RLE weakness but pt reports pain throughout this extremity.    Psychiatric:         Mood and Affect: Mood normal.         Behavior: Behavior normal.           Assessment & Plan:   1. Transient cerebral ischemia, unspecified type  Assessment & Plan:  Episode described in HPI is concerning for at least TIA if not stroke. Difficult to tell if abnormal gait and leg pain is new ischemic symptom or other pathology. Regardless, needs neuro imaging asap. With RLE and RUE symptoms, needs cervical spine imaging as well. ED precautions discussed. Pt expressed understanding. She'll continue her Xarelto.     Orders:  -     CT Head Without Contrast; Future; Expected date: 01/17/2025  -     CTA HEAD; Future; Expected date: 01/17/2025  -     CTA Neck; Future; Expected date: 01/17/2025    2. Mild anemia  -     CBC Auto Differential; Future; Expected date: 01/16/2025    3. Abnormal blood chemistry  -     Comprehensive Metabolic Panel; Future; Expected date: 01/16/2025  -     Hemoglobin A1C; Future; Expected date: 01/16/2025    4. HFrEF (heart failure with reduced ejection fraction)  -     CBC Auto Differential; Future; Expected date: 01/16/2025  -     Comprehensive Metabolic Panel; Future; Expected date: 01/16/2025  -     Lipid Panel; Future; Expected date: 01/16/2025  -     TSH; Future; Expected date: 01/16/2025  -     Urinalysis; Future; Expected date: 01/16/2025        Follow up in about 6 weeks (around 2/27/2025) for Wellness. In addition to their scheduled follow up, the patient has also been instructed to follow up on as needed basis.

## 2025-01-26 PROBLEM — G45.9 TRANSIENT CEREBRAL ISCHEMIA: Status: ACTIVE | Noted: 2025-01-26

## 2025-01-27 ENCOUNTER — TELEPHONE (OUTPATIENT)
Dept: PRIMARY CARE CLINIC | Facility: CLINIC | Age: 80
End: 2025-01-27
Payer: MEDICARE

## 2025-01-27 NOTE — ASSESSMENT & PLAN NOTE
Episode described in HPI is concerning for at least TIA if not stroke. Difficult to tell if abnormal gait and leg pain is new ischemic symptom or other pathology. Regardless, needs neuro imaging asap. With RLE and RUE symptoms, needs cervical spine imaging as well. ED precautions discussed. Pt expressed understanding. She'll continue her Xarelto.

## 2025-01-27 NOTE — LETTER
AUTHORIZATION FOR RELEASE OF   CONFIDENTIAL INFORMATION    Dear Medical records,    We are seeing Lynette Hernandez, date of birth 1945, in the clinic at [facility]. Brett Montoya MD is the patient's PCP. Lynette Hernandez has an outstanding lab/procedure at the time we reviewed her chart. In order to help keep her health information updated, she has authorized us to request the following medical record(s):        (  )  MAMMOGRAM                                      (  )  COLONOSCOPY      (  )  PAP SMEAR                                          (  )  OUTSIDE LAB RESULTS     (  )  DEXA SCAN                                          (  )  EYE EXAM            (  )  FOOT EXAM                                          (  )  ENTIRE RECORD     (  )  OUTSIDE IMMUNIZATIONS                 ( x ) last office note         Please fax records to [facility], [place], Jeffrey D, MD, [phone]     If you have any questions, please contact  at [phone].           Patient Name: Lynette Hernandez  : 1945  Patient Phone #: [phone]